# Patient Record
Sex: MALE | Race: WHITE | Employment: FULL TIME | URBAN - METROPOLITAN AREA
[De-identification: names, ages, dates, MRNs, and addresses within clinical notes are randomized per-mention and may not be internally consistent; named-entity substitution may affect disease eponyms.]

---

## 2017-07-31 ENCOUNTER — OFFICE VISIT (OUTPATIENT)
Dept: CARDIOLOGY CLINIC | Age: 42
End: 2017-07-31

## 2017-07-31 VITALS
DIASTOLIC BLOOD PRESSURE: 84 MMHG | HEIGHT: 71 IN | RESPIRATION RATE: 18 BRPM | BODY MASS INDEX: 37.44 KG/M2 | SYSTOLIC BLOOD PRESSURE: 124 MMHG | WEIGHT: 267.4 LBS | HEART RATE: 64 BPM | OXYGEN SATURATION: 98 %

## 2017-07-31 DIAGNOSIS — F17.200 SMOKER: ICD-10-CM

## 2017-07-31 DIAGNOSIS — R06.02 SOB (SHORTNESS OF BREATH): ICD-10-CM

## 2017-07-31 DIAGNOSIS — R07.9 CHEST PAIN, UNSPECIFIED: Primary | ICD-10-CM

## 2017-07-31 DIAGNOSIS — R00.2 PALPITATIONS: ICD-10-CM

## 2017-07-31 RX ORDER — METOPROLOL TARTRATE 25 MG/1
25 TABLET, FILM COATED ORAL 2 TIMES DAILY
COMMUNITY

## 2017-07-31 RX ORDER — GEMFIBROZIL 600 MG/1
600 TABLET, FILM COATED ORAL 2 TIMES DAILY
COMMUNITY

## 2017-07-31 RX ORDER — RANITIDINE 150 MG/1
150 TABLET, FILM COATED ORAL 2 TIMES DAILY
COMMUNITY
End: 2018-10-01

## 2017-07-31 NOTE — MR AVS SNAPSHOT
Visit Information Date & Time Provider Department Dept. Phone Encounter #  
 7/31/2017 10:00 AM Donya Jessica MD [facility] Deanna Buck [phone] [id number] Upcoming Health Maintenance Date Due DTaP/Tdap/Td series (1 - Tdap) 11/27/1998 INFLUENZA AGE 9 TO ADULT 8/1/2017 Allergies as of 7/31/2017  Review Complete On: 7/31/2017 By: Mikel Higgins Severity Noted Reaction Type Reactions Lisinopril  07/31/2017    Cough Current Immunizations  Never Reviewed No immunizations on file. Not reviewed this visit You Were Diagnosed With   
  
 Codes Comments Chest pain, unspecified    -  Primary ICD-10-CM: R07.9 ICD-9-CM: 786.50 SOB (shortness of breath)     ICD-10-CM: R06.02 
ICD-9-CM: 786.05 Vitals BP Pulse Resp Height(growth percentile) Weight(growth percentile) SpO2  
 124/84 (BP 1 Location: Left arm) 64 18 5' 11\" (1.803 m) 267 lb 6.4 oz (121.3 kg) 98% BMI Smoking Status 37.29 kg/m2 Current Every Day Smoker Vitals History BMI and BSA Data Body Mass Index Body Surface Area  
 37.29 kg/m 2 2.47 m 2 Your Updated Medication List  
  
   
This list is accurate as of: 7/31/17 10:43 AM.  Always use your most recent med list.  
  
  
  
  
 LOPID 600 mg tablet Generic drug:  gemfibrozil Take 600 mg by mouth two (2) times a day. metoprolol tartrate 25 mg tablet Commonly known as:  LOPRESSOR Take 12.5 mg by mouth two (2) times a day. ZANTAC 150 mg tablet Generic drug:  raNITIdine Take 150 mg by mouth two (2) times a day. We Performed the Following AMB POC EKG ROUTINE W/ 12 LEADS, INTER & REP [65102 CPT(R)] Introducing [facility]! Susy Tobar introduces [employer] patient portal. Now you can access parts of your medical record, email your doctor's office, and request medication refills online.    
 
1. In your internet browser, go to [email or web address] 2. Click on the First Time User? Click Here link in the Sign In box. You will see the New Member Sign Up page. 3. Enter your [employer] Access Code exactly as it appears below. You will not need to use this code after youve completed the sign-up process. If you do not sign up before the expiration date, you must request a new code. · [employer] Access Code: [email or web address] Expires: 10/29/2017 10:15 AM 
 
4. Enter the last four digits of your Social Security Number (xxxx) and Date of Birth (mm/dd/yyyy) as indicated and click Submit. You will be taken to the next sign-up page. 5. Create a Heart Buddyt ID. This will be your Quickshift login ID and cannot be changed, so think of one that is secure and easy to remember. 6. Create a [employer] password. You can change your password at any time. 7. Enter your Password Reset Question and Answer. This can be used at a later time if you forget your password. 8. Enter your e-mail address. You will receive e-mail notification when new information is available in [address] 9. Click Sign Up. You can now view and download portions of your medical record. 10. Click the Download Summary menu link to download a portable copy of your medical information. If you have questions, please visit the Frequently Asked Questions section of the [employer] website. Remember, [employer] is NOT to be used for urgent needs. For medical emergencies, dial 911. Now available from your iPhone and Android! Please provide this summary of care documentation to your next provider. Your primary care clinician is listed as Yoli Fernandez. If you have any questions after today's visit, please call [phone].

## 2017-07-31 NOTE — PROGRESS NOTES
Extended / Orthostatic Vitals:  Patient Position 2: Supine  BP 2: 122/80  Pulse 2: 64  Patient Position 3: Standing  BP 3: 116/78  Pulse 3: 68

## 2017-07-31 NOTE — LETTER
7/31/2017 1:21 PM 
 
Patient:  Lauralee Mcburney YOB: 1975 Date of Visit: 7/31/2017 Dear Dr. Joe Calderon., MD 
[address] [place] [address] VIA Facsimile: [phone] 
 : Thank you for referring Mr. Lauralee Mcburney to me for evaluation/treatment. Below are the relevant portions of my assessment and plan of care. Assessment: 
 
Left-sided chest pain. Palpitations Hypertension-blood pressure controlled Tobacco dependence/ marijuana use EtOH abuse Plan:  
 
Exercise nuclear study-hold metoprolol night before and the day of the test.  No caffeine for 24 hours prior to test. 
Echocardiogram. 
Holter monitor Advised to decrease/quit his smoking/decrease EtOH intake and quit smoking marijuana . Patient has Chantix but it is not helping him. He is thinking about looking into hypnotization. Follow-up after cardiac workup If you have questions, please do not hesitate to call me. I look forward to following Mr. Hassan along with you. Sincerely, Gurvinder Blancas MD

## 2017-07-31 NOTE — PROGRESS NOTES
Bao Mari MD    [id number] [place], [address] [facility]    Office [phone],MBY [phone]  Pager [phone]    Abdoulaye Lara is a 46 y.o. male referred for evaluation of chest pain. Consult requested by Lashell Resendez MD            Primary care physician:  Lashell Resendez MD      Chief complaint:  Abdoulaye Lara is a 46 y.o. male who complains of   Chief Complaint   Patient presents with    New Patient    Chest Pain (Angina)    Shortness of Breath     Dear Dr Yanet Luna,    I had the pleasure of seeing Mr Abdoulaye Lara in the office today. Assessment:    Left-sided chest pain. Palpitations  Hypertension-blood pressure controlled  Tobacco dependence/ marijuana use EtOH abuse        Plan:     Exercise nuclear study-hold metoprolol night before and the day of the test.  No caffeine for 24 hours prior to test.  Echocardiogram.  Holter monitor  Advised to decrease/quit his smoking/decrease EtOH intake and quit smoking marijuana . Patient has Chantix but it is not helping him. He is thinking about looking into hypnotization. Follow-up after cardiac workup. Labs reviewed7/14/17-/triglycerides 313, HDL-36 LDL-36; creatinine 0.93  Pt understands the plan. Aluestions were answered to the patient's satisfaction. Medication Side Effects and Warnings were discussed with patient: yes  Patient Labs were reviewed and or requested:  yes  Patient Past Records were reviewed and or requested: yes    I appreciate the opportunity to be involved in Mr. Calvin. Please see note below for details. Please do not hesitate to contact us with questions or concerns. Bao Mari MD    Cardiac Testing/ Procedures: A. Cardiac Cath/PCI:    B.ECHO/GEO:    C.StressNuclear/Stress ECHO/Stress test:    D.Vascular:    E. EP:    F. 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 Can occur with rest or with exertion. Sometimes it can be constant and sometimes intermittent. Occasionally diaphoresis present. Denies dyspnea. No swelling lower extremities. Sometimes, he feels his heart is racing. No history of dizziness, syncope, presyncope. Patient states that he is going to [place] for vacation and will do his cardiac testing if needed after his vacation. Past Medical History:   Diagnosis Date    Bell's palsy     Hypertension     Hypertriglyceridemia     Nephrolithiasis       Past Surgical History:   Procedure Laterality Date    HX LITHOTRIPSY      HX VASECTOMY       Family History   Problem Relation Age of Onset    Coronary Artery Disease Mother       Social History   Substance Use Topics    Smoking status: Current Every Day Smoker    Smokeless tobacco: Never Used      Comment: 2-3 packs per day    Alcohol use Yes      Comment: Sixpack beer 4-5 days a week          Medications before admission:    Current Outpatient Prescriptions   Medication Sig Dispense    raNITIdine (ZANTAC) 150 mg tablet Take 150 mg by mouth two (2) times a day.  gemfibrozil (LOPID) 600 mg tablet Take 600 mg by mouth two (2) times a day.  metoprolol tartrate (LOPRESSOR) 25 mg tablet Take 12.5 mg by mouth two (2) times a day. No current facility-administered medications for this visit.             Review of Systems:  (bold if positive, if negative)    Gen:  Eyes:  ENT:  CVS:  As in HPIPulm:  CoughGI:  Abdominal pain,GERD  :    MS:  Skin:  Psych:  Endo:    Hem:  Renal:    Neuro:        Physical Exam:  Visit Vitals    /84 (BP 1 Location: Left arm)    Pulse 64    Resp 18    Ht 5' 11\" (1.803 m)    Wt 267 lb 6.4 oz (121.3 kg)    SpO2 98%    BMI 37.29 kg/m2          Gen: Well-developed, well-nourished, in no acute distress  HEENT:  Pink conjunctivae, hearing intact to voice, moist mucous membranes  Neck: Supple,No JVD, No Carotid Bruit, Thyroid- non tender  Resp: No accessory muscle use, Clear breath sounds, No rales or rhonchi  Card: Regular Rate,Rythm,Normal S1, S2, No murmurs, rubs or gallop. No thrills. Abd:  Soft, non-tender, non-distended, normoactive bowel sounds are present,   MSK: No cyanosis or clubbing  Skin: No rashes or ulcers/tattoos present  Neuro: moving all four extremities, no focal deficit, follows commands appropriately  Psych:  Good insight, oriented to person, place and time, alert, Nml Affect  LE: No edema  Vascular: Radial Pulses 2+ and symmetric        EKG: Sinus bradycardia, normal axis,NSTT      Cxray:    LABS:    No results for input(s): CPK, TROIQ in the last 72 hours.     No lab exists for component: CKQMB, CPKMB    No results found for: WBC, WBCLT, HGB, HGBP, HCT, PHCT, RBCH, PLT, MCV, HGBEXT, HCTEXT, PLTEXT, HGBEXT, HCTEXT, PLTEXT  No results found for: NA, K, CL, CO2, AGAP, GLU, BUN, CREA, BUCR, GFRAA, GFRNA, CA, Mark Nguyen MD

## 2017-09-01 ENCOUNTER — CLINICAL SUPPORT (OUTPATIENT)
Dept: CARDIOLOGY CLINIC | Age: 42
End: 2017-09-01

## 2017-09-01 DIAGNOSIS — R07.9 CHEST PAIN, UNSPECIFIED TYPE: ICD-10-CM

## 2017-09-01 DIAGNOSIS — R00.2 PALPITATIONS: ICD-10-CM

## 2017-09-01 DIAGNOSIS — R06.02 SOB (SHORTNESS OF BREATH): Primary | ICD-10-CM

## 2017-09-01 DIAGNOSIS — R06.02 SOB (SHORTNESS OF BREATH): ICD-10-CM

## 2017-09-01 DIAGNOSIS — R07.89 CHEST DISCOMFORT: Primary | ICD-10-CM

## 2017-09-01 DIAGNOSIS — F17.200 SMOKER: ICD-10-CM

## 2017-09-01 NOTE — PROGRESS NOTES
Explained procedure to patient, Obtaining IV access, radiation exposure, risks and discomforts (for exericse stress test), waiting between injections and obtaining images. All concerns and questions addressed, prior to obtaining consent. See scanned report. Dr. Ruslan Park ordered and Dr. Ruslan Park read study. ID verified per protocol. Pt  reported no symptoms at completion of protocol.

## 2017-09-08 ENCOUNTER — CLINICAL SUPPORT (OUTPATIENT)
Dept: CARDIOLOGY CLINIC | Age: 42
End: 2017-09-08

## 2017-09-08 DIAGNOSIS — R06.02 SOB (SHORTNESS OF BREATH): Primary | ICD-10-CM

## 2017-09-08 NOTE — PROGRESS NOTES
Applied holter monitor for pt per Dr Griffin Fruits  Dx: SOB, palps. Pt has #[id number] & is due back on Mon 9/11/17.

## 2017-09-19 ENCOUNTER — DOCUMENTATION ONLY (OUTPATIENT)
Dept: CARDIOLOGY CLINIC | Age: 42
End: 2017-09-19

## 2017-09-22 ENCOUNTER — OFFICE VISIT (OUTPATIENT)
Dept: CARDIOLOGY CLINIC | Age: 42
End: 2017-09-22

## 2017-09-22 VITALS
HEIGHT: 71 IN | DIASTOLIC BLOOD PRESSURE: 80 MMHG | OXYGEN SATURATION: 99 % | RESPIRATION RATE: 18 BRPM | HEART RATE: 56 BPM | WEIGHT: 268.8 LBS | BODY MASS INDEX: 37.63 KG/M2 | SYSTOLIC BLOOD PRESSURE: 104 MMHG

## 2017-09-22 DIAGNOSIS — R07.89 ATYPICAL CHEST PAIN: Primary | ICD-10-CM

## 2017-09-22 NOTE — LETTER
9/22/2017 9:55 AM 
 
Patient:  Davian Zaragoza YOB: 1975 Date of Visit: 9/22/2017 Dear Dr. Jorge Luis Razo., MD 
[address] [place] [address] VIA Facsimile: [phone] 
 : Thank you for referring Mr. Davian Zaragoza to me for evaluation/treatment. Below are the relevant portions of my assessment and plan of care. Assessment: 
Atypical chest pain probably noncardiac with negative stress test/normal EF on echo Plan:  
Normal exercise nuclear study with good exercise tolerance/normal EF on echo Holter monitor-occasional PAC/PVC. Symptoms correlated with sinus rhythm/sinus arrhythmia. No significant arrhythmias. Patient is getting a GI eval. 
Aggressive cardiovascular risk factor modifications. Advised daily exercise. Follow-up in 1 year or earlier as needed. If you have questions, please do not hesitate to call me. I look forward to following Mr. Hassan along with you. Sincerely, Britt Hernandez MD

## 2017-09-22 NOTE — PROGRESS NOTES
Ulysses Decree, MD    [address] [place], [address] [facility]    Office [phone],LSI [phone]  Pager [phone]    Veronika Lechuga is a 46 y.o. male is here for f/u visit. Primary care physician:  Diandra Valdez MD    Patient Active Problem List   Diagnosis Code    Palpitations R00.2   Clyde Merlin P09.669       Dear Dr. James Canchola,    I had the pleasure of seeing Mr Veronika Lechuga in the office today. Chief complaint:  Chief Complaint   Patient presents with    Follow-up     to testing       Assessment:  Atypical chest pain probably noncardiac with negative stress test/normal EF on echo      Plan:   Normal exercise nuclear study with good exercise tolerance/normal EF on echo  Holter monitor-occasional PAC/PVC. Symptoms correlated with sinus rhythm/sinus arrhythmia. No significant arrhythmias. Patient is getting a GI eval.  Aggressive cardiovascular risk factor modifications. Advised daily exercise. Follow-up in 1 year or earlier as needed. Patient understands the plan. All questions were answered to the patient's satisfaction. Medication Side Effects and Warnings were discussed with patient: yes  Patient Labs were reviewed and or requested:  yes  Patient Past Records were reviewed and or requested: yes    I appreciate the opportunity to be involved in Mr. Calvin. See note below for details. Please do not hesitate to contact us with questions or concerns. Ulysses Decree, MD    Cardiac Testing/ Procedures: A. Cardiac Cath/PCI:    B.ECHO/GEO:9/1/17 EF 60%/Mild wall thickness. C.StressNuclear/Stress ECHO/Stress test: 9/1/17-stress nuclear study-normal; 10.4 minutes; normal EF    D. Vascular:    E. EP: Holter 9/8/17-sinus rhythm, 43-1 20 bpm, 2 PACs, one PVC, diary entries correlated with sinus/sinus arrhythmia. F. Miscellaneous:    Subjective:  Veronika Lechuga is a 46 y.o. male who returns for follow up visit.   Continues to have intermittent transient left/right precordial chest pains with or without activity. Resolved spontaneously. No diaphoresis/dyspnea. No swelling lower extremities. ROS:  (bold if positive, if negative)             Medications before admission:    Current Outpatient Prescriptions   Medication Sig Dispense    raNITIdine (ZANTAC) 150 mg tablet Take 150 mg by mouth two (2) times a day.  gemfibrozil (LOPID) 600 mg tablet Take 600 mg by mouth two (2) times a day.  metoprolol tartrate (LOPRESSOR) 25 mg tablet Take 12.5 mg by mouth two (2) times a day. No current facility-administered medications for this visit. Family History of CAD:    No    Social History:  Current  Smoker  Yes    Physical Exam:  Visit Vitals    /80 (BP 1 Location: Left arm)    Pulse (!) 56    Resp 18    Ht 5' 11\" (1.803 m)    Wt 268 lb 12.8 oz (121.9 kg)    SpO2 99%    BMI 37.49 kg/m2          Gen: Well-developed, well-nourished, in no acute distress  Neck: Supple,No JVD, No Carotid Bruit,   Resp: No accessory muscle use, Clear breath sounds, No rales or rhonchi  Card: Regular Rate,Rythm,Normal S1, S2, No murmurs, rubs or gallop. No thrills.    Abd:  Soft, non-tender, non-distended,BS+,   MSK: No cyanosis  Skin: No rashes    Neuro: moving all four extremities , follows commands appropriately  Psych:  Good insight, oriented to person, place , alert, Nml Affect  LE: No edema        LABS:        No results found for: WBC, HGB, HCT, HCT, PLT, HGBEXT, HCTEXT, HCTEXT, PLTEXT, HGBEXT, HCTEXT, HCTEXT, PLTEXT  No results found for: NA, K, CL, CO2, AGAP, GLU, BUN, CREA, BUCR, GFRAA, GFRNA, CA, GFRAA    No results found for: APTT  No results found for: INR, PTMR, PTP, PT1, PT2  No components found for: Fe Wells MD

## 2017-09-22 NOTE — MR AVS SNAPSHOT
Visit Information Date & Time Provider Department Dept. Phone Encounter #  
 9/22/2017  9:00 AM Hal Dempsey MD [facility] Nerissa Hammond [phone] [id number] Upcoming Health Maintenance Date Due Pneumococcal 19-64 Medium Risk (1 of 1 - PPSV23) 11/27/1996 DTaP/Tdap/Td series (1 - Tdap) 11/27/1998 INFLUENZA AGE 9 TO ADULT 8/1/2017 Allergies as of 9/22/2017  Review Complete On: 9/22/2017 By: Antonio Hurtado Severity Noted Reaction Type Reactions Lisinopril  07/31/2017    Cough Current Immunizations  Never Reviewed No immunizations on file. Not reviewed this visit Vitals BP Pulse Resp Height(growth percentile) Weight(growth percentile) SpO2  
 104/80 (BP 1 Location: Left arm) (!) 56 18 5' 11\" (1.803 m) 268 lb 12.8 oz (121.9 kg) 99% BMI Smoking Status 37.49 kg/m2 Current Every Day Smoker Vitals History BMI and BSA Data Body Mass Index Body Surface Area  
 37.49 kg/m 2 2.47 m 2 Your Updated Medication List  
  
   
This list is accurate as of: 9/22/17  9:31 AM.  Always use your most recent med list.  
  
  
  
  
 LOPID 600 mg tablet Generic drug:  gemfibrozil Take 600 mg by mouth two (2) times a day. metoprolol tartrate 25 mg tablet Commonly known as:  LOPRESSOR Take 12.5 mg by mouth two (2) times a day. ZANTAC 150 mg tablet Generic drug:  raNITIdine Take 150 mg by mouth two (2) times a day. Introducing [address]! Daya Joaquin introduces [facility] patient portal. Now you can access parts of your medical record, email your doctor's office, and request medication refills online. 1. In your internet browser, go to [email or web address] 2. Click on the First Time User? Click Here link in the Sign In box. You will see the New Member Sign Up page. 3. Enter your [facility] [facility] Access Code exactly as it appears below.  You will not need to use this code after youve completed the sign-up process. If you do not sign up before the expiration date, you must request a new code. · [employer] Access Code: [email or web address] Expires: 10/29/2017 10:15 AM 
 
4. Enter the last four digits of your Social Security Number (xxxx) and Date of Birth (mm/dd/yyyy) as indicated and click Submit. You will be taken to the next sign-up page. 5. Create a [employer] ID. This will be your [employer] login ID and cannot be changed, so think of one that is secure and easy to remember. 6. Create a [employer] password. You can change your password at any time. 7. Enter your Password Reset Question and Answer. This can be used at a later time if you forget your password. 8. Enter your e-mail address. You will receive e-mail notification when new information is available in [address] 9. Click Sign Up. You can now view and download portions of your medical record. 10. Click the Download Summary menu link to download a portable copy of your medical information. If you have questions, please visit the Frequently Asked Questions section of the [employer] website. Remember, [employer] is NOT to be used for urgent needs. For medical emergencies, dial 911. Now available from your iPhone and Android! Please provide this summary of care documentation to your next provider. Your primary care clinician is listed as Jagjit Bower. If you have any questions after today's visit, please call [phone].

## 2018-10-01 ENCOUNTER — APPOINTMENT (OUTPATIENT)
Dept: GENERAL RADIOLOGY | Age: 43
End: 2018-10-01
Attending: STUDENT IN AN ORGANIZED HEALTH CARE EDUCATION/TRAINING PROGRAM
Payer: COMMERCIAL

## 2018-10-01 ENCOUNTER — HOSPITAL ENCOUNTER (EMERGENCY)
Age: 43
Discharge: HOME | End: 2018-10-01
Attending: EMERGENCY MEDICINE
Payer: COMMERCIAL

## 2018-10-01 VITALS
BODY MASS INDEX: 37.98 KG/M2 | OXYGEN SATURATION: 96 % | DIASTOLIC BLOOD PRESSURE: 85 MMHG | SYSTOLIC BLOOD PRESSURE: 115 MMHG | RESPIRATION RATE: 14 BRPM | TEMPERATURE: 98.2 F | HEIGHT: 72 IN | HEART RATE: 81 BPM | WEIGHT: 280.43 LBS

## 2018-10-01 DIAGNOSIS — R07.9 CHEST PAIN, UNSPECIFIED TYPE: Primary | ICD-10-CM

## 2018-10-01 LAB
ALBUMIN SERPL-MCNC: 4.4 G/DL (ref 3.5–5)
ALBUMIN/GLOB SERPL: 1.2 {RATIO} (ref 1.1–2.2)
ALP SERPL-CCNC: 77 U/L (ref 45–117)
ALT SERPL-CCNC: 50 U/L (ref 12–78)
ANION GAP SERPL CALC-SCNC: 7 MMOL/L (ref 5–15)
AST SERPL-CCNC: 24 U/L (ref 15–37)
ATRIAL RATE: 79 BPM
BASOPHILS # BLD: 0.1 K/UL (ref 0–0.1)
BASOPHILS NFR BLD: 1 % (ref 0–1)
BILIRUB SERPL-MCNC: 1.1 MG/DL (ref 0.2–1)
BUN SERPL-MCNC: 15 MG/DL (ref 6–20)
BUN/CREAT SERPL: 19 (ref 12–20)
CALCIUM SERPL-MCNC: 8.6 MG/DL (ref 8.5–10.1)
CALCULATED P AXIS, ECG09: 27 DEGREES
CALCULATED R AXIS, ECG10: 6 DEGREES
CALCULATED T AXIS, ECG11: -13 DEGREES
CHLORIDE SERPL-SCNC: 106 MMOL/L (ref 97–108)
CK MB CFR SERPL CALC: 0.8 % (ref 0–2.5)
CK MB SERPL-MCNC: 1.9 NG/ML (ref 5–25)
CK SERPL-CCNC: 207 U/L (ref 39–308)
CK SERPL-CCNC: 244 U/L (ref 39–308)
CK SERPL-CCNC: 244 U/L (ref 39–308)
CO2 SERPL-SCNC: 27 MMOL/L (ref 21–32)
CREAT SERPL-MCNC: 0.79 MG/DL (ref 0.7–1.3)
DIAGNOSIS, 93000: NORMAL
DIFFERENTIAL METHOD BLD: NORMAL
EOSINOPHIL # BLD: 0.1 K/UL (ref 0–0.4)
EOSINOPHIL NFR BLD: 1 % (ref 0–7)
ERYTHROCYTE [DISTWIDTH] IN BLOOD BY AUTOMATED COUNT: 11.5 % (ref 11.5–14.5)
GLOBULIN SER CALC-MCNC: 3.6 G/DL (ref 2–4)
GLUCOSE SERPL-MCNC: 106 MG/DL (ref 65–100)
HCT VFR BLD AUTO: 42.9 % (ref 36.6–50.3)
HGB BLD-MCNC: 15.4 G/DL (ref 12.1–17)
IMM GRANULOCYTES # BLD: 0 K/UL (ref 0–0.04)
IMM GRANULOCYTES NFR BLD AUTO: 0 % (ref 0–0.5)
INR PPP: 1.1 (ref 0.9–1.1)
LYMPHOCYTES # BLD: 1.7 K/UL (ref 0.8–3.5)
LYMPHOCYTES NFR BLD: 19 % (ref 12–49)
MAGNESIUM SERPL-MCNC: 2 MG/DL (ref 1.6–2.4)
MCH RBC QN AUTO: 32.6 PG (ref 26–34)
MCHC RBC AUTO-ENTMCNC: 35.9 G/DL (ref 30–36.5)
MCV RBC AUTO: 90.9 FL (ref 80–99)
MONOCYTES # BLD: 0.6 K/UL (ref 0–1)
MONOCYTES NFR BLD: 7 % (ref 5–13)
NEUTS SEG # BLD: 6.6 K/UL (ref 1.8–8)
NEUTS SEG NFR BLD: 72 % (ref 32–75)
NRBC # BLD: 0 K/UL (ref 0–0.01)
NRBC BLD-RTO: 0 PER 100 WBC
P-R INTERVAL, ECG05: 148 MS
PLATELET # BLD AUTO: 312 K/UL (ref 150–400)
PMV BLD AUTO: 10.3 FL (ref 8.9–12.9)
POTASSIUM SERPL-SCNC: 3.5 MMOL/L (ref 3.5–5.1)
PROT SERPL-MCNC: 8 G/DL (ref 6.4–8.2)
PROTHROMBIN TIME: 11.1 SEC (ref 9–11.1)
Q-T INTERVAL, ECG07: 356 MS
QRS DURATION, ECG06: 92 MS
QTC CALCULATION (BEZET), ECG08: 408 MS
RBC # BLD AUTO: 4.72 M/UL (ref 4.1–5.7)
SODIUM SERPL-SCNC: 140 MMOL/L (ref 136–145)
TROPONIN I SERPL-MCNC: <0.05 NG/ML
TROPONIN I SERPL-MCNC: <0.05 NG/ML
VENTRICULAR RATE, ECG03: 79 BPM
WBC # BLD AUTO: 9.1 K/UL (ref 4.1–11.1)

## 2018-10-01 PROCEDURE — 82550 ASSAY OF CK (CPK): CPT | Performed by: EMERGENCY MEDICINE

## 2018-10-01 PROCEDURE — 83735 ASSAY OF MAGNESIUM: CPT | Performed by: EMERGENCY MEDICINE

## 2018-10-01 PROCEDURE — 36415 COLL VENOUS BLD VENIPUNCTURE: CPT | Performed by: EMERGENCY MEDICINE

## 2018-10-01 PROCEDURE — 71045 X-RAY EXAM CHEST 1 VIEW: CPT

## 2018-10-01 PROCEDURE — 85025 COMPLETE CBC W/AUTO DIFF WBC: CPT | Performed by: EMERGENCY MEDICINE

## 2018-10-01 PROCEDURE — 99285 EMERGENCY DEPT VISIT HI MDM: CPT

## 2018-10-01 PROCEDURE — 82553 CREATINE MB FRACTION: CPT | Performed by: EMERGENCY MEDICINE

## 2018-10-01 PROCEDURE — 84484 ASSAY OF TROPONIN QUANT: CPT | Performed by: EMERGENCY MEDICINE

## 2018-10-01 PROCEDURE — 93005 ELECTROCARDIOGRAM TRACING: CPT

## 2018-10-01 PROCEDURE — 74011250637 HC RX REV CODE- 250/637: Performed by: EMERGENCY MEDICINE

## 2018-10-01 PROCEDURE — 74011250637 HC RX REV CODE- 250/637: Performed by: STUDENT IN AN ORGANIZED HEALTH CARE EDUCATION/TRAINING PROGRAM

## 2018-10-01 PROCEDURE — 80053 COMPREHEN METABOLIC PANEL: CPT | Performed by: EMERGENCY MEDICINE

## 2018-10-01 PROCEDURE — 85610 PROTHROMBIN TIME: CPT | Performed by: EMERGENCY MEDICINE

## 2018-10-01 RX ORDER — ACETAMINOPHEN 500 MG
1000 TABLET ORAL ONCE
Status: COMPLETED | OUTPATIENT
Start: 2018-10-01 | End: 2018-10-01

## 2018-10-01 RX ORDER — NITROGLYCERIN 0.4 MG/1
0.4 TABLET SUBLINGUAL
Status: COMPLETED | OUTPATIENT
Start: 2018-10-01 | End: 2018-10-01

## 2018-10-01 RX ORDER — ASPIRIN 325 MG
325 TABLET ORAL ONCE
Status: COMPLETED | OUTPATIENT
Start: 2018-10-01 | End: 2018-10-01

## 2018-10-01 RX ORDER — LANSOPRAZOLE 30 MG/1
30 TABLET, ORALLY DISINTEGRATING, DELAYED RELEASE ORAL DAILY
COMMUNITY
End: 2019-12-06

## 2018-10-01 RX ADMIN — ASPIRIN 325 MG: 325 TABLET ORAL at 08:24

## 2018-10-01 RX ADMIN — ACETAMINOPHEN 1000 MG: 500 TABLET, FILM COATED ORAL at 09:35

## 2018-10-01 RX ADMIN — NITROGLYCERIN 0.4 MG: 0.4 TABLET, ORALLY DISINTEGRATING SUBLINGUAL at 08:26

## 2018-10-01 RX ADMIN — NITROGLYCERIN 0.4 MG: 0.4 TABLET, ORALLY DISINTEGRATING SUBLINGUAL at 08:32

## 2018-10-01 RX ADMIN — NITROGLYCERIN 1 INCH: 20 OINTMENT TOPICAL at 08:49

## 2018-10-01 RX ADMIN — NITROGLYCERIN 0.4 MG: 0.4 TABLET, ORALLY DISINTEGRATING SUBLINGUAL at 08:37

## 2018-10-01 NOTE — ROUTINE PROCESS
Dr Ward Nicely reviewed discharge instructions with the patient. The patient verbalized understanding. Alert and stable to walk at discharge.

## 2018-10-01 NOTE — ED PROVIDER NOTES
EMERGENCY DEPARTMENT HISTORY AND PHYSICAL EXAM 
 
 
Date: 10/1/2018 Patient Name: Daniela Singh History of Presenting Illness Chief Complaint Patient presents with  Chest Pain  
  states \"feels like someone standing on my chest\" with shortness of breath History Provided By: Patient HPI: Daniela Singh, 38 y.o. male with PMHx significant for HTN, Hypertriglyceridemia, DM, Smoking, Alcohol Use, presents POV to the ED with cc of Chest pain that is in the left center of his chest. Feels like someone is sitting on his chest with intermittent stabbing pains. Does not get worse with palpation. Reports that it started around 6 pm last night when he was sitting at home. Exertion makes his pain worse and he gets increasingly short of breath with minimal exertion. Reports neck, jaw and back pain that are chronic in nature. The patient reports he has a history of intermittent sharp chest pains for which he has had some work up for but has been negative. Last stress in 9/2017 was read as normal. He took 132 mg of aspirin early this morning around 1am when the pain did not go away. He also drank a half can of red bull this morning. PCP: Win Harris MD 
 
Current Outpatient Prescriptions Medication Sig Dispense Refill  lansoprazole (PREVACID SOLUTAB) 30 mg disintegrating tablet Take 30 mg by mouth daily.  gemfibrozil (LOPID) 600 mg tablet Take 600 mg by mouth two (2) times a day.  metoprolol tartrate (LOPRESSOR) 25 mg tablet Take 25 mg by mouth two (2) times a day. Past History Past Medical History: 
Past Medical History:  
Diagnosis Date  Bell's palsy  Hypertension  Hypertriglyceridemia  Nephrolithiasis Past Surgical History: 
Past Surgical History:  
Procedure Laterality Date  HX LITHOTRIPSY  HX VASECTOMY Family History: 
Family History Problem Relation Age of Onset  Coronary Artery Disease Mother Social History: Social History Substance Use Topics  Smoking status: Former Smoker Quit date: 7/1/2018  Smokeless tobacco: Never Used Comment: 2-3 packs per day  Alcohol use Yes Comment: Sixpack beer 4-5 days a week Allergies: Allergies Allergen Reactions  Lisinopril Cough Review of Systems Review of Systems Constitutional: Negative for chills and fever. HENT: Negative for congestion. Eyes: Negative for visual disturbance. Respiratory: Positive for chest tightness and shortness of breath. Cardiovascular: Positive for chest pain. Negative for leg swelling. Gastrointestinal: Negative for abdominal pain and vomiting. Endocrine: Negative for polyuria. Genitourinary: Negative for dysuria. Musculoskeletal: Positive for back pain and neck pain. Skin:  
     Flushing Neurological: Negative for headaches. Psychiatric/Behavioral: Negative for agitation. Physical Exam  
Physical Exam  
Constitutional: He is oriented to person, place, and time. He appears well-developed and well-nourished. Moderate distress HENT:  
Head: Normocephalic and atraumatic. Eyes: Pupils are equal, round, and reactive to light. Right eye exhibits no discharge. Left eye exhibits no discharge. Neck: Normal range of motion. Neck supple. Cardiovascular: Normal rate, regular rhythm, normal heart sounds and intact distal pulses. Exam reveals no gallop and no friction rub. No murmur heard. Pulmonary/Chest:  
CTAB, no wheeze rales or rhonchi, no accessory muscle use Abdominal: Soft. There is no tenderness. There is no rebound and no guarding. Musculoskeletal: Normal range of motion. Neurological: He is alert and oriented to person, place, and time. No focal deficits Skin: Skin is warm and dry. Facial flushing Psychiatric: He has a normal mood and affect. Diagnostic Study Results Labs - Recent Results (from the past 12 hour(s)) EKG, 12 LEAD, INITIAL Collection Time: 10/01/18  7:36 AM  
Result Value Ref Range Ventricular Rate 79 BPM  
 Atrial Rate 79 BPM  
 P-R Interval 148 ms QRS Duration 92 ms Q-T Interval 356 ms  
 QTC Calculation (Bezet) 408 ms Calculated P Axis 27 degrees Calculated R Axis 6 degrees Calculated T Axis -13 degrees Diagnosis Normal sinus rhythm Nonspecific T wave abnormality No previous ECGs available Confirmed by Clary Ashraf ([id number]) on 10/1/2018 8:33:13 AM 
  
CBC WITH AUTOMATED DIFF Collection Time: 10/01/18  8:14 AM  
Result Value Ref Range WBC 9.1 4.1 - 11.1 K/uL  
 RBC 4.72 4.10 - 5.70 M/uL  
 HGB 15.4 12.1 - 17.0 g/dL HCT 42.9 36.6 - 50.3 % MCV 90.9 80.0 - 99.0 FL  
 MCH 32.6 26.0 - 34.0 PG  
 MCHC 35.9 30.0 - 36.5 g/dL  
 RDW 11.5 11.5 - 14.5 % PLATELET 564 518 - 401 K/uL MPV 10.3 8.9 - 12.9 FL  
 NRBC 0.0 0  WBC ABSOLUTE NRBC 0.00 0.00 - 0.01 K/uL NEUTROPHILS 72 32 - 75 % LYMPHOCYTES 19 12 - 49 % MONOCYTES 7 5 - 13 % EOSINOPHILS 1 0 - 7 % BASOPHILS 1 0 - 1 % IMMATURE GRANULOCYTES 0 0.0 - 0.5 % ABS. NEUTROPHILS 6.6 1.8 - 8.0 K/UL  
 ABS. LYMPHOCYTES 1.7 0.8 - 3.5 K/UL  
 ABS. MONOCYTES 0.6 0.0 - 1.0 K/UL  
 ABS. EOSINOPHILS 0.1 0.0 - 0.4 K/UL  
 ABS. BASOPHILS 0.1 0.0 - 0.1 K/UL  
 ABS. IMM. GRANS. 0.0 0.00 - 0.04 K/UL  
 DF AUTOMATED METABOLIC PANEL, COMPREHENSIVE Collection Time: 10/01/18  8:14 AM  
Result Value Ref Range Sodium 140 136 - 145 mmol/L Potassium 3.5 3.5 - 5.1 mmol/L Chloride 106 97 - 108 mmol/L  
 CO2 27 21 - 32 mmol/L Anion gap 7 5 - 15 mmol/L Glucose 106 (H) 65 - 100 mg/dL BUN 15 6 - 20 MG/DL Creatinine 0.79 0.70 - 1.30 MG/DL  
 BUN/Creatinine ratio 19 12 - 20 GFR est AA >60 >60 ml/min/1.73m2 GFR est non-AA >60 >60 ml/min/1.73m2 Calcium 8.6 8.5 - 10.1 MG/DL Bilirubin, total 1.1 (H) 0.2 - 1.0 MG/DL  
 ALT (SGPT) 50 12 - 78 U/L  
 AST (SGOT) 24 15 - 37 U/L Alk.  phosphatase 77 45 - 117 U/L  
 Protein, total 8.0 6.4 - 8.2 g/dL Albumin 4.4 3.5 - 5.0 g/dL Globulin 3.6 2.0 - 4.0 g/dL A-G Ratio 1.2 1.1 - 2.2    
TROPONIN I Collection Time: 10/01/18  8:14 AM  
Result Value Ref Range Troponin-I, Qt. <0.05 <0.05 ng/mL CK W/ CKMB & INDEX Collection Time: 10/01/18  8:14 AM  
Result Value Ref Range  39 - 308 U/L  
 CK - MB 1.9 <3.6 NG/ML  
 CK-MB Index 0.8 0 - 2.5 CK W/ REFLX CKMB Collection Time: 10/01/18  8:14 AM  
Result Value Ref Range  39 - 308 U/L  
PROTHROMBIN TIME + INR Collection Time: 10/01/18  8:14 AM  
Result Value Ref Range INR 1.1 0.9 - 1.1 Prothrombin time 11.1 9.0 - 11.1 sec MAGNESIUM Collection Time: 10/01/18  8:14 AM  
Result Value Ref Range Magnesium 2.0 1.6 - 2.4 mg/dL TROPONIN I Collection Time: 10/01/18 11:54 AM  
Result Value Ref Range Troponin-I, Qt. <0.05 <0.05 ng/mL CK W/ REFLX CKMB Collection Time: 10/01/18 11:54 AM  
Result Value Ref Range  39 - 308 U/L Radiologic Studies -  
XR CHEST PORT Final Result CT Results  (Last 48 hours) None CXR Results  (Last 48 hours) 10/01/18 0746  XR CHEST PORT Final result Impression:  IMPRESSION:  
No acute finding Narrative:  INDICATION: Chest pain COMPARISON: None A single frontal view was obtained. The time of this study is 0742 hours. The  
lungs are clear. The heart and mediastinal shadows are normal.   
   
   
   
  
  
 
 
 
Medical Decision Making I am the first provider for this patient. I reviewed the vital signs, available nursing notes, past medical history, past surgical history, family history and social history. Vital Signs-Reviewed the patient's vital signs. Patient Vitals for the past 12 hrs: 
 Temp Pulse Resp BP SpO2  
10/01/18 1100 - 81 14 115/85 96 % 10/01/18 1030 - 84 19 124/79 96 % 10/01/18 1000 - 83 21 123/76 96 % 10/01/18 0930 - 84 17 134/65 97 % 10/01/18 0915 - 91 19 142/90 96 % 10/01/18 0900 - 89 19 147/76 97 % 10/01/18 0849 - 90 - 132/75 -  
10/01/18 0845 - 87 17 132/75 97 % 10/01/18 0841 - 92 14 142/63 97 % 10/01/18 0837 - 79 - 142/63 -  
10/01/18 0832 - 88 - (!) 157/97 -  
10/01/18 0830 - 87 16 (!) 157/97 98 % 10/01/18 0827 - 85 14 - 97 % 10/01/18 0826 - 85 - (!) 154/94 -  
10/01/18 0815 - 85 15 (!) 154/94 97 % 10/01/18 0801 - 82 19 (!) 164/95 97 % 10/01/18 0745 - 87 12 (!) 158/131 98 % 10/01/18 0731 98.2 °F (36.8 °C) 82 20 (!) 187/110 100 % Pulse Oximetry Analysis - 100% on room air Cardiac Monitor:  
Rate: 82 bpm 
Rhythm: Normal Sinus Rhythm EKG interpretation: (Preliminary) Rhythm: normal sinus rhythm; and regular . Rate (approx.): 79; Axis: normal; NE interval: normal; QRS interval: normal ; ST/T wave: depressions and non-specific changes; Other findings: Compared to previous 7/31/17, Inferior ST depressions are chronic. Lateral T wave flattening and TWI is new in comparison to previous Records Reviewed: Nursing Notes, Old Medical Records and Previous electrocardiograms Provider Notes (Medical Decision Making):  
38year old male patient with multiple cardiac risk factors presents to the ED for chest pain. EKG with nonspecific ST changes. HEART Score 6. Will get two sets of cardiac enzymes three hours apart. Cxray. Will treat chest pain with Nitro Tabs and Paste and reassess. Low risk for PE confirmed with PERC score. Pain more consistent with ischemia than dissection. No trauma to suggest PTX (also has bilateral breath sounds), or tamponade, no vomiting to consider Borhaeves. ED Course:  
Initial assessment performed. The patients presenting problems have been discussed, and they are in agreement with the care plan formulated and outlined with them. I have encouraged them to ask questions as they arise throughout their visit. 08:55 Jeff Lambert MD at bedside for repeat evaluation.  SBP improved, but patient complaining of headache. Tylenol to be given. Await second troponin in 4 hours with observation. 9:08 AM 
Chest pain from an 8 to a 2 after three sublingual nitro tablets 1:15 PM 
Pt chest pressure has resolved at this time. Two troponins over four hours apart are negative. Spoke directly to his cardiology office and scheduled an appointment for tomorrow at 1020. Pt is aware of this appointment and is able to go. Disposition: 
Discharge with Cardiology follow up tomorrow PLAN: 
1. Discharge Medication List as of 10/1/2018  1:01 PM  
  
 
2. Follow-up Information Follow up With Details Comments Contact Info Malick Martinez MD In 1 week  [facility] [phone] [facility] 
[phone] Roberto Carlos Olivas MD Call today for follow up recheck [address] [facility] [address] [place] [address] 
[phone] Return to ED if worse Diagnosis Clinical Impression: 1. Chest pain, unspecified type Attestations: 
I personally saw and examined the patient. I have reviewed and agree with the residents findings, including all diagnostic interpretations, and plans as written. I was present during the key portions of separately billed procedures. Kierra Santana.  MD Rose Marie

## 2018-10-01 NOTE — ED NOTES
CP now 0/10, pt reports he feels warm across his chest.  Medicated with tylenol as ordered. Plan to draw repeat troponin at 12 nn explained. Resting with call bell and cell phone within reach.

## 2018-10-01 NOTE — LETTER
[address] 
[facility] EMERGENCY DEPT 
[address] [place] [address] [address] [id number] 
[phone] Work/School Note Date: 10/1/2018 To Whom It May concern: 
 
Deysi Samayoa was seen and treated today in the emergency room by the following provider(s): 
Attending Provider: Frank Grullon.  MD Rose Marie 
Resident: Vernell Salcido Steen should not return to work until he is seen and cleared by his cardiologist 
 
Sincerely, 
 
 
 
 
Linus Gonzalez DO

## 2018-10-01 NOTE — DISCHARGE INSTRUCTIONS
Your work up today was negative for an acute cause of your chest pain. This does not mean that you arent at risk for Heart attack in the future. You should follow up with your Cardiologist as soon as possible to have a stress test scheduled in order to further investigate your risk of having major cardiac event in the next 30 days. If you have new or worsening symptoms please return immediately to the ED for further work up. Chest Pain: Care Instructions  Your Care Instructions  There are many things that can cause chest pain. Some are not serious and will get better on their own in a few days. But some kinds of chest pain need more testing and treatment. Your doctor may have recommended a follow-up visit in the next 8 to 12 hours. If you are not getting better, you may need more tests or treatment. Even though your doctor has released you, you still need to watch for any problems. The doctor carefully checked you, but sometimes problems can develop later. If you have new symptoms or if your symptoms do not get better, get medical care right away. If you have worse or different chest pain or pressure that lasts more than 5 minutes or you passed out (lost consciousness), call 911 or seek other emergency help right away. A medical visit is only one step in your treatment. Even if you feel better, you still need to do what your doctor recommends, such as going to all suggested follow-up appointments and taking medicines exactly as directed. This will help you recover and help prevent future problems. How can you care for yourself at home? · Rest until you feel better. · Take your medicine exactly as prescribed. Call your doctor if you think you are having a problem with your medicine. · Do not drive after taking a prescription pain medicine. When should you call for help?   Call 911 if:    · You passed out (lost consciousness).     · You have severe difficulty breathing.     · You have symptoms of a heart attack. These may include:  ¨ Chest pain or pressure, or a strange feeling in your chest.  ¨ Sweating. ¨ Shortness of breath. ¨ Nausea or vomiting. ¨ Pain, pressure, or a strange feeling in your back, neck, jaw, or upper belly or in one or both shoulders or arms. ¨ Lightheadedness or sudden weakness. ¨ A fast or irregular heartbeat. After you call 911, the  may tell you to chew 1 adult-strength or 2 to 4 low-dose aspirin. Wait for an ambulance. Do not try to drive yourself.    Call your doctor today if:    · You have any trouble breathing.     · Your chest pain gets worse.     · You are dizzy or lightheaded, or you feel like you may faint.     · You are not getting better as expected.     · You are having new or different chest pain. Where can you learn more? Go to http://jerald-desiree.info/. Enter A120 in the search box to learn more about \"Chest Pain: Care Instructions. \"  Current as of: November 20, 2017  Content Version: 11.7  © 0750-7349 [employer]. Care instructions adapted under license by [employer] (which disclaims liability or warranty for this information). If you have questions about a medical condition or this instruction, always ask your healthcare professional. [address] any warranty or liability for your use of this information. Resume your diet and contact your cardiologist before beginning an exercise regimen.

## 2018-10-03 ENCOUNTER — OFFICE VISIT (OUTPATIENT)
Dept: CARDIOLOGY CLINIC | Age: 43
End: 2018-10-03

## 2018-10-03 VITALS
SYSTOLIC BLOOD PRESSURE: 130 MMHG | RESPIRATION RATE: 16 BRPM | WEIGHT: 279.2 LBS | BODY MASS INDEX: 37.82 KG/M2 | HEIGHT: 72 IN | HEART RATE: 67 BPM | OXYGEN SATURATION: 99 % | DIASTOLIC BLOOD PRESSURE: 82 MMHG

## 2018-10-03 DIAGNOSIS — E66.9 OBESITY (BMI 35.0-39.9 WITHOUT COMORBIDITY): ICD-10-CM

## 2018-10-03 DIAGNOSIS — Z87.891 HISTORY OF TOBACCO ABUSE: ICD-10-CM

## 2018-10-03 DIAGNOSIS — R00.2 PALPITATIONS: ICD-10-CM

## 2018-10-03 DIAGNOSIS — R06.02 SOB (SHORTNESS OF BREATH): ICD-10-CM

## 2018-10-03 DIAGNOSIS — R07.89 ATYPICAL CHEST PAIN: Primary | ICD-10-CM

## 2018-10-03 DIAGNOSIS — G47.33 OBSTRUCTIVE SLEEP APNEA: ICD-10-CM

## 2018-10-03 PROBLEM — E66.01 SEVERE OBESITY (HCC): Status: ACTIVE | Noted: 2018-10-03

## 2018-10-03 NOTE — PROGRESS NOTES
Cora Pal, [facility]  [address]  [place], [address] [facility]    Office [phone]  Fax [phone]        Johnetta Simmonds is a 38 y.o. male who is followed outpatient by Dr. Chiki Magaña and was last seen September 2017. Patient is here today for follow-up of chest pain and palpitations. Assessment  Encounter Diagnoses   Name Primary?  Atypical chest pain Yes    SOB (shortness of breath)     Palpitations     History of tobacco abuse     Obstructive sleep apnea     Obesity (BMI 35.0-39.9 without comorbidity)        Recommendations:    Atypical Chest Pain, SOB  - ongoing 2-3 yrs, recently more freq  - past cardiac work-up negative  - will repeat NM stress and TTE given risk factors; however, pain does not sound cardiac  - rec pulmonary work up if not already done; pt to see PCP, PCP note reports some pulmonary work-up in past   - low risk for PE, O2 sats and HR stable    Palpitations, frequent dizziness  - PACs, PVCs noted prior   - assess PAC / PVC burden w/ 48 hr holter; look for other arrythmias    HTN  - hm BPs 130s/80s  - cont metoprolol 25mg q12 hrs    Recent Hx of Tobacco Use  - quit 3 months ago; prev smoked 2 ppd x 20 years    JENNA  - prev intolerant to mask and not using CPAP  - would like new provider, will refer to sleep center for add'll eval / tx    Elevated Fasting Blood Glucose  - Glucose 115 on recent labs, management per PCP    Obesity, BMI 37.87  - weight up 11lb since last year    Rec diet and weight loss; will discuss in more detail at follow-up visit as pt feels very limited by CP today. OK to return to work. Denies excessive lifting, pushing, or pulling at work. Is up walking and supervising; does not perform manual labor. Follow-up Disposition:  Return in about 3 months (around 1/3/2019), or if symptoms worsen or fail to improve.      Subjective:    Johnetta Simmonds is a 46-year-old male with past medical history of hypertension, hypertriglyceridemia, and tobacco use who was recently seen in the emergency room with chest pain. Patient states chest pain has been ongoing for 2-3 years; symptoms are unchanged but have recently become more frequent. Typically pain only lasts a few minutes but this past Monday pain started in the middle of the night and was ongoing in the AM causing pt to go to the ED. Patient feels as if someone is sitting on his chest with intermittent stabbing to the left side; most recently pain has been associated with SOB, diaphoresis, and left am numbness. Denies dizziness, lightheadedness, nausea, and vomiting with pain. Pain appears to be erratic and can come at any time regardless of activity level. In ED pt had negative trop x2 and ECG w/ non specific T wave changes. Aspirin and Nitro provided some relief. Separative of chest pain patient also reports frequent palpitations. Feels a fluttering in his chest so significant it can take his breath away. This has also become more frequent and occurs daily. Lastly patient has persistent dizziness and lightheadedness. States this is not related to positional changes. Blood pressures at home well controlled. Denies syncope and falls. Denies any exertional chest pain, orthopnea, edema and paroxysmal nocturnal dyspnea. Denies cough and congestion. Denies abdominal pain, diarrhea, and constipation. Denies abnormal bleeding. Was previously diagnosed with JENNA but does not use CPAP. No longer has machine. Reports frequent apneic episodes during the day where he must gasp for air. Snores at night and feels unrested / fatigued during the day. Stopped smoking 3 months ago. Denies pulmonary workup including pulmonary function studies or chest CT. Family history negative for CAD    Cardiac testing  Echo 9/1/17: LVEF 60%, no WMAs, RV nml, no sig valvular disease    NM Stress 9/1/2017: 13.4 METS terminated 2/2 fatigue, occ PVCs - good exercise tolerance.   Nml myocardial perfusion study; no inducile ischemia, LVEF 53%    24hr Holter 9/8/2017: predominant rhythm sinus; rates , 2 PACs, 1 PVC. Diary entries of pain correlate with sinus / sinus arrhythmia  rates 67-85 BPM.     ECG 10/1/2018: NSR, non specific T wave abnormality  ECG 7/31/17: Sinus Bradycardia, rate 59    Past Medical History:   Diagnosis Date    Bell's palsy     Hypertension     Hypertriglyceridemia     Nephrolithiasis         Current Outpatient Prescriptions   Medication Sig Dispense Refill    lansoprazole (PREVACID SOLUTAB) 30 mg disintegrating tablet Take 30 mg by mouth daily.  gemfibrozil (LOPID) 600 mg tablet Take 600 mg by mouth two (2) times a day.  metoprolol tartrate (LOPRESSOR) 25 mg tablet Take 25 mg by mouth two (2) times a day. Allergies   Allergen Reactions    Lisinopril Cough          Review of Systems  Constitutional: Negative for fever, chills. Positive for fatigue, frequent diaphoresis  Respiratory: Negative for cough, hemoptysis, sputum production, and wheezing. Positive shortness of breath  Cardiovascular: Negative for orthopnea, claudication, leg swelling and PND. Positive chest pain and palpitations  Gastrointestinal: Negative for heartburn, nausea, vomiting, blood in stool and melena. Genitourinary: Negative for dysuria and flank pain. Neurological: Negative for focal weakness, loss of consciousness, weakness and headaches. Endo/Heme/Allergies: Negative for abnormal bleeding  Psychiatric/Behavioral: Negative for memory loss. The patient does not have insomnia.       Physical Exam    Visit Vitals    /82 (BP 1 Location: Right arm, BP Patient Position: Sitting)    Pulse 67    Resp 16    Ht 6' (1.829 m)    Wt 279 lb 3.2 oz (126.6 kg)    SpO2 99%    BMI 37.87 kg/m2     Wt Readings from Last 3 Encounters:   10/03/18 279 lb 3.2 oz (126.6 kg)   10/01/18 280 lb 6.8 oz (127.2 kg)   09/22/17 268 lb 12.8 oz (121.9 kg)      General - well developed well nourished, obese  Neck - JVP normal, thyroid nl  Cardiac - normal S1, S2, no murmurs, rubs or gallops. No clicks, left chest tender to palpation  Vascular - carotids without bruits, radials and pedal pulses equal bilateral  Lungs - clear to auscultation bilaterals, no rales, wheezing or rhonchi  Abd -protuberant, soft nontender, nondistended, positive bowel sounds   extremities - no edema, warm, well-perfused  Skin - no rash  Neuro - nonfocal  Psych - normal mood and affect      Labs:   Fasting 8/28/18: Glucose 115, Cr. 0.70.  BUN 12, , K 4.2, Bili 0.5, AST 25, ALT 30, eGFR 118  Triglycerides 216, HDL 34, Tot Chol 126, LDL 49,     Jamee Sundar, ANP

## 2018-10-03 NOTE — LETTER
NOTIFICATION OF RETURN TO WORK / SCHOOL 
 
10/3/2018 Mr. Daniela Singh [address] [place] [id number] To Whom It May Concern: 
 
Daniela Singh is safe to return to work Monday 10/8/18. If there are questions or concerns please have the patient contact our office.  
 
 
 
Sincerely, 
 
 
Cinthia Barnard NP

## 2018-10-03 NOTE — PROGRESS NOTES
1. Have you been to the ER, urgent care clinic since your last visit? Hospitalized since your last visit? [facility] 2. Have you seen or consulted any other health care providers outside of the [address] [place] [address] since your last visit? Include any pap smears or colon screening. No    Chief Complaint   Patient presents with    Shortness of Breath     39 y/o male reports to office for chest pressure.  Chest Pain     Pt reports that shocking feeling in chest w/ SOB.  Dizziness    Numbness     Pt reports numbness and tingling left arm. Pt states was given aspirin and Nitroglycerin temporary relief.      Visit Vitals    /82 (BP 1 Location: Right arm, BP Patient Position: Sitting)    Pulse 67    Resp 16    Ht 6' (1.829 m)    Wt 279 lb 3.2 oz (126.6 kg)    SpO2 99%    BMI 37.87 kg/m2

## 2018-10-03 NOTE — MR AVS SNAPSHOT
[age] y [address] [place] [address] 
[phone] Patient: Kylee Ball MRN: [id number] :1975 Visit Information Date & Time Provider Department Dept. Phone Encounter #  
 10/3/2018 11:00 AM Titi Frank NP [facility] Jacqueline Crain [phone] [id number] Your Appointments 2018  2:00 PM  
ESTABLISHED PATIENT with Richard Lund MD  
[facility] Appt Note: annual; annual; annual pt/s from   
 [address] [facility] [place] [address]  
[address] [place] [address] [address] Upcoming Health Maintenance Date Due DTaP/Tdap/Td series (1 - Tdap) 1998 Influenza Age 5 to Adult 2018 Allergies as of 10/3/2018  Review Complete On: 10/3/2018 By: Joel Holden Severity Noted Reaction Type Reactions Lisinopril  2017    Cough Current Immunizations  Never Reviewed No immunizations on file. Not reviewed this visit You Were Diagnosed With   
  
 Codes Comments Atypical chest pain    -  Primary ICD-10-CM: R07.89 ICD-9-CM: 786.59   
 SOB (shortness of breath)     ICD-10-CM: R06.02 
ICD-9-CM: 786.05 Palpitations     ICD-10-CM: R00.2 ICD-9-CM: 785.1 Smoker     ICD-10-CM: U05.200 ICD-9-CM: 305.1 Vitals BP Pulse Resp Height(growth percentile) Weight(growth percentile) SpO2  
 130/82 (BP 1 Location: Right arm, BP Patient Position: Sitting) 67 16 6' (1.829 m) 279 lb 3.2 oz (126.6 kg) 99% BMI Smoking Status 37.87 kg/m2 Former Smoker Vitals History BMI and BSA Data Body Mass Index Body Surface Area  
 37.87 kg/m 2 2.54 m 2 Preferred Pharmacy Pharmacy Name Phone [address] [place], [address] [facility]. [phone] Your Updated Medication List  
  
   
 This list is accurate as of 10/3/18 12:13 PM.  Always use your most recent med list.  
  
  
  
  
 lansoprazole 30 mg disintegrating tablet Commonly known as:  Melanie Rana Take 30 mg by mouth daily. LOPID 600 mg tablet Generic drug:  gemfibrozil Take 600 mg by mouth two (2) times a day. metoprolol tartrate 25 mg tablet Commonly known as:  LOPRESSOR Take 25 mg by mouth two (2) times a day. We Performed the Following 2D ECHO COMPLETE ADULT (TTE) W OR WO CONTR [78360 CPT(R)] To-Do List   
 10/04/2018 ECG:  STRESS TEST CARDIOLITE Patient Instructions I will schedule you for a repeat stress test, as well as ultrasound of the heart and a 48 hour monitor. Please follow-up with primary care as soon as possible to have your lungs evaluated. See the MD in 3 months. Introducing [facility]! Dear Tex Castro: Thank you for requesting a [employer] account. Our records indicate that you already have an active [employer] information [employer] account. You can access your account anytime at [email or web address] Did you know that you can access your hospital and ER discharge instructions at any time in [employer] information [employer]? You can also review all of your test results from your hospital stay or ER visit. Additional Information If you have questions, please visit the Frequently Asked Questions section of the uma information technology website at [email or web address] Remember, [employer] information [employer] is NOT to be used for urgent needs. For medical emergencies, dial 911. Now available from your iPhone and Android! Please provide this summary of care documentation to your next provider. Your primary care clinician is listed as NONE. If you have any questions after today's visit, please call [phone].

## 2018-10-03 NOTE — PATIENT INSTRUCTIONS
I will schedule you for a repeat stress test, as well as ultrasound of the heart and a 48 hour monitor. Please follow-up with primary care as soon as possible to have your lungs evaluated. See the MD in 3 months. If chest pain returns and not resolving, then seek medical help / attention.

## 2018-10-05 ENCOUNTER — TELEPHONE (OUTPATIENT)
Dept: SLEEP MEDICINE | Age: 43
End: 2018-10-05

## 2018-10-19 ENCOUNTER — CLINICAL SUPPORT (OUTPATIENT)
Dept: CARDIOLOGY CLINIC | Age: 43
End: 2018-10-19

## 2018-10-19 ENCOUNTER — DOCUMENTATION ONLY (OUTPATIENT)
Dept: CARDIOLOGY CLINIC | Age: 43
End: 2018-10-19

## 2018-10-19 DIAGNOSIS — R00.2 PALPITATIONS: ICD-10-CM

## 2018-10-19 DIAGNOSIS — R06.02 SOB (SHORTNESS OF BREATH): ICD-10-CM

## 2018-10-19 DIAGNOSIS — R07.89 ATYPICAL CHEST PAIN: ICD-10-CM

## 2018-10-19 NOTE — PROGRESS NOTES
Pt started 48hr holter monitor for palpitations per Dr. Gato Hammond. Pt given instructions and verbalized understanding.

## 2018-11-02 ENCOUNTER — CLINICAL SUPPORT (OUTPATIENT)
Dept: CARDIOLOGY CLINIC | Age: 43
End: 2018-11-02

## 2018-11-02 DIAGNOSIS — R07.89 ATYPICAL CHEST PAIN: Primary | ICD-10-CM

## 2018-11-02 DIAGNOSIS — R06.02 SOB (SHORTNESS OF BREATH): Primary | ICD-10-CM

## 2018-11-02 DIAGNOSIS — R06.02 SHORTNESS OF BREATH: ICD-10-CM

## 2018-11-02 DIAGNOSIS — R00.2 PALPITATIONS: ICD-10-CM

## 2018-11-02 DIAGNOSIS — R07.9 CHEST PAIN, UNSPECIFIED TYPE: ICD-10-CM

## 2018-11-02 DIAGNOSIS — R06.02 SOB (SHORTNESS OF BREATH): ICD-10-CM

## 2018-11-02 NOTE — PROGRESS NOTES
See scanned document. Ordering Doctor:Dr. Deborah Canchola  Reading Doctor:Dr. Deborah Canchola    Patient tolerated procedure well, but unable to reach THR due to knee pain after 9 1/2 min on the TM. Switched to [employer].

## 2018-11-05 NOTE — ADDENDUM NOTE
Addended by: Shashank Necessary on: 11/5/2018 11:06 AM     Modules accepted: Orders, Level of Service, SmartSet

## 2018-11-07 ENCOUNTER — TELEPHONE (OUTPATIENT)
Dept: CARDIOLOGY CLINIC | Age: 43
End: 2018-11-07

## 2018-11-07 NOTE — TELEPHONE ENCOUNTER
Called to review recent stress / echo results. No option to leave VM. Stress and echo were both nml. Stress Cardiolite 11/2/2018: nml lexiscan. LVEF 47%  Echo 11/2/18: LVEF 55%, no WMA, mild concentric hypertrophy.

## 2018-11-23 ENCOUNTER — OFFICE VISIT (OUTPATIENT)
Dept: NEUROLOGY | Age: 43
End: 2018-11-23

## 2018-11-23 VITALS
HEIGHT: 72 IN | DIASTOLIC BLOOD PRESSURE: 88 MMHG | HEART RATE: 53 BPM | SYSTOLIC BLOOD PRESSURE: 130 MMHG | WEIGHT: 279 LBS | BODY MASS INDEX: 37.79 KG/M2 | OXYGEN SATURATION: 98 %

## 2018-11-23 DIAGNOSIS — G89.29 CHRONIC INTRACTABLE HEADACHE, UNSPECIFIED HEADACHE TYPE: ICD-10-CM

## 2018-11-23 DIAGNOSIS — H53.2 DIPLOPIA: ICD-10-CM

## 2018-11-23 DIAGNOSIS — R20.0 BILATERAL NUMBNESS AND TINGLING OF ARMS AND LEGS: ICD-10-CM

## 2018-11-23 DIAGNOSIS — R51.9 CHRONIC INTRACTABLE HEADACHE, UNSPECIFIED HEADACHE TYPE: ICD-10-CM

## 2018-11-23 DIAGNOSIS — R42 DIZZINESS: ICD-10-CM

## 2018-11-23 DIAGNOSIS — R20.2 BILATERAL NUMBNESS AND TINGLING OF ARMS AND LEGS: ICD-10-CM

## 2018-11-23 DIAGNOSIS — H02.401 PTOSIS, RIGHT EYELID: ICD-10-CM

## 2018-11-23 DIAGNOSIS — R29.810 FACIAL DROOP: Primary | ICD-10-CM

## 2018-11-23 RX ORDER — OMEPRAZOLE 20 MG/1
20 TABLET, DELAYED RELEASE ORAL 2 TIMES DAILY
COMMUNITY

## 2018-11-23 RX ORDER — AMITRIPTYLINE HYDROCHLORIDE 25 MG/1
TABLET, FILM COATED ORAL
Qty: 60 TAB | Refills: 1 | Status: SHIPPED | OUTPATIENT
Start: 2018-11-23 | End: 2019-02-20 | Stop reason: SDUPTHER

## 2018-11-23 NOTE — PROGRESS NOTES
Name:  Tala Bonilla      :  1975    PCP:   Sidney Doe NP      Referring:  As above  MRN:   [id number]    Chief Complaint:   Chief Complaint   Patient presents with    Other     double vision possible MS       HISTORY OF PRESENT ILLNESS:     This is a 38 y.o. male who presents for evaluation of double vision and to evaluate for possible Multiple Sclerosis. Pt describes that over the past few months, he's had sporadic episodes where vision goes blurry and sees double, \"like you're looking at something cross-eyed,\" which resolves quickly after rubbing eyes. No complaints of dysphagia, or head/ neck weakness. He describes having right eyelid droop and left lower facial weakness (crossed signs ?) since  when he was diagnosed with Bell's Palsy at his local hospital (? [address] [place] [address]) then  transferred to [facility] ER where he says the Bell's Palsy diagnosis was confirmed in the ER and he was discharged home. He doesn't recall ever having a Brain MRI to evaluate for these symptoms, but his wife says it's very hard to get him to go to the doctor. He denies any leg weakness, but says arms feel weak. Other complaints: constant/ frequent dizziness (was using OTC meclizine), frequent headaches, numbness/ tingling in all extremities (upper >> lower), constant sensation of tingling that makes him want to move legs, memory difficulty. Chronic back pain and neck pain. Says that the other complaints have been present for years, but worsened over the past year. Had [facility] Spotted Fever (+ antibodies but didn't have the typical symptoms). Admits to having anxiety (mild), mild depression.         Complete Review of Systems: + chest pain, depression, fatigue, headaches, joint pain, memory loss, muscle pain, muscle weakness, dyspnea, snoring, stomach pain, vertigo, visual disturbance; otherwise as noted in HPI     Allergies   Allergen Reactions    Lisinopril Cough     Past Medical History:   Diagnosis Date    Bell's palsy     Headache     Hypertension     Hypertriglyceridemia     Nephrolithiasis      Current Outpatient Medications   Medication Sig Dispense Refill    omeprazole (PRILOSEC OTC) 20 mg tablet Take 20 mg by mouth daily.  amitriptyline (ELAVIL) 25 mg tablet Take 1 to 2 tabs at bedtime. Antidepressant to reduce headache frequency and help sleep 60 Tab 1    gemfibrozil (LOPID) 600 mg tablet Take 600 mg by mouth two (2) times a day.  metoprolol tartrate (LOPRESSOR) 25 mg tablet Take 25 mg by mouth two (2) times a day.  lansoprazole (PREVACID SOLUTAB) 30 mg disintegrating tablet Take 30 mg by mouth daily.        Past Surgical History:   Procedure Laterality Date    HX LITHOTRIPSY      HX VASECTOMY       Family History   Problem Relation Age of Onset    Coronary Artery Disease Mother      Social History     Socioeconomic History    Marital status:      Spouse name: Not on file    Number of children: Not on file    Years of education: Not on file    Highest education level: Not on file   Social Needs    Financial resource strain: Not on file    Food insecurity - worry: Not on file    Food insecurity - inability: Not on file   [employer] needs - medical: Not on file   [employer] needs - non-medical: Not on file   Occupational History    Not on file   Tobacco Use    Smoking status: Former Smoker     Last attempt to quit: 2018     Years since quittin.3    Smokeless tobacco: Never Used    Tobacco comment: 2-3 packs per day   Substance and Sexual Activity    Alcohol use: Yes     Comment: Sixpack beer 4-5 days a week    Drug use: Yes     Types: Marijuana     Comment: Almost on a daily basis    Sexual activity: Not on file   Other Topics Concern    Not on file   Social History Narrative    Not on file       PHYSICAL EXAM  Vitals:    18 1006   BP: 130/88   Pulse: (!) 53   SpO2: 98%   Weight: 126.6 kg (279 lb)   Height: 6' (1.829 m)       General: Alert, cooperative, NAD   Head:  Normocephalic, atraumatic. Eyes:  Conjunctivae/corneas clear   Lungs:  Heart:  Not examined  Not examined   Extremities: No edema. Skin: No rashes    Neurologic Exam       Language: normal  Memory:  Alert, oriented to person, place, situation    Cranial Nerves:  I: smell Not tested   II: visual fields Full to confrontation   II: pupils Equal, round, reactive to light   II: optic disc No papilledema   III,VII: ptosis none   III,IV,VI: extraocular muscles  normal   V: facial light touch sensation  normal   VII: facial muscle function  Left lower facial weakness, right eye ptosis   VIII: hearing symmetric   IX: soft palate elevation  Mild droop on right side   XI: sternocleidomastoid strength 5/5   XII: tongue  midline      Motor: normal bulk, tone, strength in all exts  Sensory: intact to LT, PP x 4 exts   + tinel's at left elbow    Cerebellar: no rest, postural, or intention tremor  Normal FNF bilaterally. Normal Heel-shin on right, pt struggles to do H-Shin with left (flexiblity). Reflexes: 1+ biceps/ triceps; 2+ patellars, 1+ achilles (symmetric) Plantar response: neutral bilaterally    Gait: normal gait including tandem  Romberg negative       ASSESSMENT AND PLAN    ICD-10-CM ICD-9-CM    1. Facial droop R29.810 781.94 MRI BRAIN W WO CONT      ACETYLCHOLINE RECEPTOR PANEL   2. Ptosis, right eyelid H02.401 374.30 MRI BRAIN W WO CONT      ACETYLCHOLINE RECEPTOR PANEL      CK   3. Bilateral numbness and tingling of arms and legs R20.0 782.0 MRI BRAIN W WO CONT    R20.2  MRI CERV SPINE W WO CONT      EMG LIMITED   4. Dizziness R42 780.4 MRI BRAIN W WO CONT      EEG AMB NEURO   5. Chronic intractable headache, unspecified headache type R51 784.0 MRI BRAIN W WO CONT      amitriptyline (ELAVIL) 25 mg tablet   6. Diplopia H53.2 368.2 MRI BRAIN W WO CONT      ACETYLCHOLINE RECEPTOR PANEL       38 y.o. male with multiple complaints    1) Recurrent blurred vision and diplopia.   2) Numbness in both arms. 3) Numbness in both leg requiring him to move legs. 4) Chronic dizziness  5) Chronic headaches (hx of kidney stones precludes use of Topamax). 6) Hx of crossed cranial nerve signs (left lower facial weakness and right eyelid droop) which aren't consistent with Bell's Palsy. Check Ach-r antibodies (eval for Myasthenia Gravis), check MRI Brain +/- contrast (eval for MS, stroke, other abnormalities), check MRI C-spine to evaluate for cord lesions or disc herniations to cause arm numbness, check EEG to r/o underlying seizure discharges, check EMG/ NCS both arms to eval for CTS, Ulnar neuropathy or cervical radiculopathy, check NCS one leg to eval for polyneuropathy. Considered various headache preventives but can't take topamax due to hx of kidney stones, and resting HR is 53 today (bordeline low, on Beta-blocker) so don't want to change betablocker. Will try Amitriptyline 25 mg one to two caps QHS to reduce headache frequency and help insomnia.       Follow up 5-6 weeks to go over results      Signed By: Vincent Bowman MD     November 23, 2018

## 2018-11-23 NOTE — PROGRESS NOTES
New patient for double vision. Referred by PCP    Patient wants to get tested for MS. He has symptoms of memory loss, slurred speech, numbness and tingling, dizziness. Symptoms have been around for awhile but worse in the past 1.5 years. Told he had bells palsy in early 2000s. And 2016 told he had jonny mountain spotted fever. Accompanied by his wife.

## 2018-11-23 NOTE — PATIENT INSTRUCTIONS
[facility]   Statement to Patients  April 1, 2014      In an effort to ensure the large volume of patient prescription refills is processed in the most efficient and expeditious manner, we are asking our patients to assist us by calling your Pharmacy for all prescription refills, this will include also your  Mail Order Pharmacy. The pharmacy will contact our office electronically to continue the refill process. Please do not wait until the last minute to call your pharmacy. We need at least 48 hours (2days) to fill prescriptions. We also encourage you to call your pharmacy before going to  your prescription to make sure it is ready. With regard to controlled substance prescription refill requests (narcotic refills) that need to be picked up at our office, we ask your cooperation by providing us with at least 72 hours (3days) notice that you will need a refill. We will not refill narcotic prescription refill requests after 4:00pm on any weekday, Monday through Thursday, or after 2:00pm on Fridays, or on the weekends. We encourage everyone to explore another way of getting your prescription refill request processed using [employer], our patient web portal through our electronic medical record system. [employer] is an efficient and effective way to communicate your medication request directly to the office and  downloadable as an robinson on your smart phone . [employer] also features a review functionality that allows you to view your medication list as well as leave messages for your physician. Are you ready to get connected? If so please review the attatched instructions or speak to any of our staff to get you set up right away! Thank you so much for your cooperation. Should you have any questions please contact our Practice Administrator.     The Physicians and Staff,  [facility]

## 2018-11-29 LAB
ACHR BIND AB SER-SCNC: <0.03 NMOL/L (ref 0–0.24)
ACHR BLOCK AB SER-ACNC: 15 % (ref 0–25)
ACHR MOD AB/ACHR TOTAL SFR SER: <12 % (ref 0–20)
CK SERPL-CCNC: 71 U/L (ref 24–204)

## 2018-12-24 ENCOUNTER — OFFICE VISIT (OUTPATIENT)
Dept: NEUROLOGY | Age: 43
End: 2018-12-24

## 2018-12-24 DIAGNOSIS — R20.0 BILATERAL NUMBNESS AND TINGLING OF ARMS AND LEGS: Primary | ICD-10-CM

## 2018-12-24 DIAGNOSIS — R20.2 NUMBNESS AND TINGLING: Primary | ICD-10-CM

## 2018-12-24 DIAGNOSIS — R20.0 NUMBNESS AND TINGLING: Primary | ICD-10-CM

## 2018-12-24 DIAGNOSIS — R20.2 BILATERAL NUMBNESS AND TINGLING OF ARMS AND LEGS: Primary | ICD-10-CM

## 2018-12-24 NOTE — PROCEDURES
EMG/ NCS Report  [facility]  - [facility]  [facility]  [facility] [place], [address] [place], [address]   Ph: [phone]   FAX: [phone]/ [phone]  Test Date:  2018    Patient: Neil Rivers : 1975 Physician: Francis Dodson M.D. Sex: Male Height: ' \" Ref Phys: Francis Dodson M.D.   ID#: [id number] Weight:  lbs. Technician: Tsering Alvarez     Patient History:  CC: check both arms for pain in hands, numbness, tingling. Check one leg to eval for peripheral neuropathy. EMG & NCV Findings:  Evaluation of the right Fibular motor nerve showed normal distal onset latency (3.3 ms), normal amplitude (10.3 mV), normal conduction velocity (B Fib-Ankle, 49 m/s), and normal conduction velocity (Poplt-B Fib, 59 m/s). The left median motor and the right median motor nerves showed normal distal onset latency (L3.3, R3.5 ms), normal amplitude (L9.5, R11.9 mV), and normal conduction velocity (Elbow-Wrist, L59, R58 m/s). The right tibial motor nerve showed normal distal onset latency (4.1 ms), normal amplitude (10.4 mV), and normal conduction velocity (Knee-Ankle, 47 m/s). The left ulnar motor nerve showed normal distal onset latency (2.5 ms), normal amplitude (9.4 mV), normal conduction velocity (B Elbow-Wrist, 65 m/s), and normal conduction velocity (A Elbow-B Elbow, 71 m/s). The right ulnar motor nerve showed normal distal onset latency (3.0 ms), reduced amplitude (6.9 mV), normal conduction velocity (B Elbow-Wrist, 76 m/s), and normal conduction velocity (A Elbow-B Elbow, 50 m/s). The left median sensory, the right median sensory, the left radial sensory, the right radial sensory, the right sural sensory, the left ulnar sensory, and the right ulnar sensory nerves showed normal distal peak latency (L3.0, R3.3, L1.9, R1.8, R3.2, L2.8, R2.8 ms) and normal amplitude (L44.2, R43.5, L44.5, R67.4, R15.6, L28.0, R24.9 µV).   The right Sup Fibular sensory nerve showed normal distal peak latency (2.4 ms), normal amplitude (13.1 µV), and normal conduction velocity (Lower leg-Lat ankle, 56 m/s). The left median/ulnar (palm) comparison nerve showed normal distal onset latency (Median Palm, 1.5 ms), normal distal peak latency (Median Palm, 2.0 ms), normal amplitude (Median Palm, 32.5 µV), normal distal onset latency (Ulnar Palm, 1.3 ms), normal distal peak latency (Ulnar Palm, 1.8 ms), and normal amplitude (Ulnar Palm, 9.7 µV). The right median/ulnar (palm) comparison nerve showed normal distal onset latency (Median Palm, 1.8 ms), prolonged distal peak latency (Median Palm, 2.3 ms), normal amplitude (Median Palm, 37.0 µV), normal distal onset latency (Ulnar Palm, 0.9 ms), normal distal peak latency (Ulnar Palm, 1.5 ms), and normal amplitude (Ulnar Palm, 22.1 µV). All left vs. right side differences were within normal limits. All F Wave latencies were within normal limits. All F Wave left vs. right side latency differences were within normal limits. Needle evaluation of the right first dorsal interosseous muscle showed slightly increased polyphasic potentials. All remaining muscles (as indicated in the following table) showed no evidence of electrical instability. 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 No evidence of left CTS. No evidence of cervical radiculopathy or ulnar neuropathy of either upper extremity. No evidence of peripheral neuropathy.        ___________________________  Portia Costa M.D.      Nerve Conduction Studies  Anti Sensory Summary Table     Stim Site NR Peak (ms) Norm Peak (ms) P-T Amp (µV) Norm P-T Amp Site1 Site2 Dist (cm)   Left Median Anti Sensory (2nd Digit)  30.6°C   Wrist    3.0 <4 44.2 >13 Wrist 2nd Digit 14.0   Right Median Anti Sensory (2nd Digit)  32°C   Wrist    3.3 <4 43.5 >13 Wrist 2nd Digit 14.0   Left Radial Anti Sensory (Base 1st Digit)  30.4°C   Wrist    1.9 <2.8 44.5 >11 Wrist Base 1st Digit 10.0   Right Radial Anti Sensory (Base 1st Digit)  31.3°C   Wrist    1.8 <2.8 67.4 >11 Wrist Base 1st Digit 10.0   Right Sup Fibular Anti Sensory (Lat ankle)  31.5°C   Lower leg    2.4 <4.5 13.1 >5 Lower leg Lat ankle 10.0   Site 2    2.6  9.7       Right Sural Anti Sensory (Lat Mall)  32.2°C   Calf    3.2 <4.5 15.6 >4.0 Calf Lat Mall 14.0   Site 2    3.3  18.6       Left Ulnar Anti Sensory (5th Digit)  30.4°C   Wrist    2.8 <4.0 28.0 >9 Wrist 5th Digit 14.0   Right Ulnar Anti Sensory (5th Digit)  31.4°C   Wrist    2.8 <4.0 24.9 >9 Wrist 5th Digit 14.0     Motor Summary Table     Stim Site NR Onset (ms) Norm Onset (ms) O-P Amp (mV) Norm O-P Amp Amp (Prev) (%) Site1 Site2 Dist (cm) Umesh (m/s) Norm Umesh (m/s)   Right Fibular Motor (Ext Dig Brev)  31.2°C   Ankle    3.3 <6.5 10.3 >2.6 100.0 Ankle Ext Dig Brev 8.0     B Fib    10.2  10.3  100.0 B Fib Ankle 34.0 49 >38   Poplt    11.9  10.0  97.1 Poplt B Fib 10.0 59 >42   Left Median Motor (Abd Poll Brev)  30.3°C   Wrist    3.3 <4.5 9.5 >4.1 100.0 Wrist Abd Poll Brev 8.0     Elbow    7.0  8.9  93.7 Elbow Wrist 22.0 59 >49   Right Median Motor (Abd Poll Brev)  30.9°C   Wrist    3.5 <4.5 11.9 >4.1 100.0 Wrist Abd Poll Brev 8.0     Elbow    7.3  11.5  96.6 Elbow Wrist 22.0 58 >49   Right Tibial Motor (Abd Murrieta Brev)  31°C   Ankle    4.1 <6.1 10.4 >5.3 100.0 Ankle Abd Murrieta Brev 8.0     Knee    12.2  7.4  71.2 Knee Ankle 38.0 47 >39   Left Ulnar Motor (Abd Dig Minimi)  30.3°C   Wrist    2.5 <3.1 9.4 >7.0 100.0 Wrist Abd Dig Minimi 8.0  >50   B Elbow    5.9  9.2  97.9 B Elbow Wrist 22.0 65 >50   A Elbow    7.3  9.0  97.8 A Elbow B Elbow 10.0 71 >50   Right Ulnar Motor (Abd Dig Minimi)  30.4°C   Wrist    3.0 <3.1 6.9 >7.0 100.0 Wrist Abd Dig Minimi 8.0  >50   B Elbow    5.9  6.1  88.4 B Elbow Wrist 22.0 76 >50   A Elbow    7.9  6.0  98.4 A Elbow B Elbow 10.0 50 >50     Comparison Summary Table     Stim Site NR Peak (ms) P-T Amp (µV) Site1 Site2 Dist (cm) Delta-0 (ms)   Left Median/Ulnar Palm Comparison (Wrist)  30.2°C   Median Palm    2.0 37.9 Median Palm Ulnar Palm 8.0 0.2   Ulnar Palm    1.8 15.5       Right Median/Ulnar Palm Comparison (Wrist)  29.8°C   Median Palm    2.3 49.3 Median Palm Ulnar Palm 8.0 0.9   Ulnar Palm    1.5 19.0         F Wave Studies     NR F-Lat (ms) Lat Norm (ms) L-R F-Lat (ms) L-R Lat Norm   Right Tibial (Mrkrs) (Abd Hallucis)  30.8°C      47.36 <56  <5.7   Left Ulnar (Mrkrs) (Abd Dig Min)  30.4°C      26.79 <32 0.00 <2.5   Right Ulnar (Mrkrs) (Abd Dig Min)  30.2°C      26.79 <32 0.00 <2.5     H Reflex Studies     NR H-Lat (ms) L-R H-Lat (ms) L-R Lat Norm   Right Tibial (Gastroc)  30.9°C      30.13  <2.0     EMG     Side Muscle Nerve Root Ins Act Fibs Psw Recrt Duration Amp Poly Comment   Left 1stDorInt Ulnar C8-T1 Nml Nml Nml Nml Nml Nml Nml    Left Biceps Musculocut C5-6 Nml Nml Nml Nml Nml Nml Nml    Left Triceps Radial C6-7-8 Nml Nml Nml Nml Nml Nml Nml    Left Deltoid Axillary C5-6 Nml Nml Nml Nml Nml Nml Nml    Left Lower Cerv Parasp Rami C7,T1 Nml Nml Nml Nml Nml Nml Nml    Right Abd Poll Brev Median C8-T1 Nml Nml Nml Nml Nml Nml Nml    Right 1stDorInt Ulnar C8-T1 Nml Nml Nml Nml Nml Nml 1+    Right Biceps Musculocut C5-6 Nml Nml Nml Nml Nml Nml Nml    Right Triceps Radial C6-7-8 Nml Nml Nml Nml Nml Nml Nml    Right Deltoid Axillary C5-6 Nml Nml Nml Nml Nml Nml Nml    Right Lower Cerv Parasp Rami C7,T1 Nml Nml Nml Nml Nml Nml Nml      Waveforms:

## 2018-12-28 ENCOUNTER — HOSPITAL ENCOUNTER (OUTPATIENT)
Dept: MRI IMAGING | Age: 43
Discharge: HOME | End: 2018-12-28
Attending: NURSE PRACTITIONER
Payer: COMMERCIAL

## 2018-12-28 VITALS — BODY MASS INDEX: 37.97 KG/M2 | WEIGHT: 280 LBS

## 2018-12-28 DIAGNOSIS — R20.2 NUMBNESS AND TINGLING: ICD-10-CM

## 2018-12-28 DIAGNOSIS — R20.0 NUMBNESS AND TINGLING: ICD-10-CM

## 2018-12-28 PROCEDURE — 72156 MRI NECK SPINE W/O & W/DYE: CPT

## 2018-12-28 PROCEDURE — A9575 INJ GADOTERATE MEGLUMI 0.1ML: HCPCS | Performed by: RADIOLOGY

## 2018-12-28 PROCEDURE — 74011250636 HC RX REV CODE- 250/636: Performed by: RADIOLOGY

## 2018-12-28 PROCEDURE — 70553 MRI BRAIN STEM W/O & W/DYE: CPT

## 2018-12-28 RX ORDER — GADOTERATE MEGLUMINE 376.9 MG/ML
25 INJECTION INTRAVENOUS
Status: COMPLETED | OUTPATIENT
Start: 2018-12-28 | End: 2018-12-28

## 2018-12-28 RX ADMIN — GADOTERATE MEGLUMINE 25 ML: 376.9 INJECTION INTRAVENOUS at 19:00

## 2019-01-11 ENCOUNTER — OFFICE VISIT (OUTPATIENT)
Dept: SLEEP MEDICINE | Age: 44
End: 2019-01-11

## 2019-01-11 VITALS
SYSTOLIC BLOOD PRESSURE: 134 MMHG | WEIGHT: 276 LBS | BODY MASS INDEX: 37.38 KG/M2 | OXYGEN SATURATION: 96 % | HEIGHT: 72 IN | HEART RATE: 67 BPM | DIASTOLIC BLOOD PRESSURE: 83 MMHG

## 2019-01-11 DIAGNOSIS — G47.33 OSA (OBSTRUCTIVE SLEEP APNEA): Primary | ICD-10-CM

## 2019-01-11 DIAGNOSIS — I10 ESSENTIAL HYPERTENSION: ICD-10-CM

## 2019-01-11 NOTE — PATIENT INSTRUCTIONS
[address] [place]. [place], [address]  Tel.  [phone]  Fax. 100 [facility] 60  [place], [address] [facility]  Tel.  [phone]  Fax. [phone] [address] [place]  Tel.  [phone]  Fax. [phone]     Sleep Apnea: After Your Visit  Your Care Instructions  Sleep apnea occurs when you frequently stop breathing for 10 seconds or longer during sleep. It can be mild to severe, based on the number of times per hour that you stop breathing or have slowed breathing. Blocked or narrowed airways in your nose, mouth, or throat can cause sleep apnea. Your airway can become blocked when your throat muscles and tongue relax during sleep. Sleep apnea is common, occurring in 1 out of 20 individuals. Individuals having any of the following characteristics should be evaluated and treated right away due to high risk and detrimental consequences from untreated sleep apnea:  1. Obesity  2. Congestive Heart failure  3. Atrial Fibrillation  4. Uncontrolled Hypertension  5. Type II Diabetes  6. Night-time Arrhythmias  7. Stroke  8. Pulmonary Hypertension  9. High-risk Driving Populations (pilots, truck drivers, etc.)  10. Patients Considering Weight-loss Surgery    How do you know you have sleep apnea? You probably have sleep apnea if you answer 'yes' to 3 or more of the following questions:  S - Have you been told that you Snore? T - Are you often Tired during the day? O - Has anyone Observed you stop breathing while sleeping? P- Do you have (or are being treated for) high blood Pressure? B - Are you obese (Body Mass Index > 35)? A - Is your Age 48years old or older? N - Is your Neck size greater than 16 inches? G - Are you male Gender? A sleep physician can prescribe a breathing device that prevents tissues in the throat from blocking your airway.  Or your doctor may recommend using a dental device (oral breathing device) to help keep your airway open. In some cases, surgery may be needed to remove enlarged tissues in the throat. Follow-up care is a key part of your treatment and safety. Be sure to make and go to all appointments, and call your doctor if you are having problems. It's also a good idea to know your test results and keep a list of the medicines you take. How can you care for yourself at home? · Lose weight, if needed. It may reduce the number of times you stop breathing or have slowed breathing. · Go to bed at the same time every night. · Sleep on your side. It may stop mild apnea. If you tend to roll onto your back, sew a pocket in the back of your pajama top. Put a tennis ball into the pocket, and stitch the pocket shut. This will help keep you from sleeping on your back. · Avoid alcohol and medicines such as sleeping pills and sedatives before bed. · Do not smoke. Smoking can make sleep apnea worse. If you need help quitting, talk to your doctor about stop-smoking programs and medicines. These can increase your chances of quitting for good. · Prop up the head of your bed 4 to 6 inches by putting bricks under the legs of the bed. · Treat breathing problems, such as a stuffy nose, caused by a cold or allergies. · Use a continuous positive airway pressure (CPAP) breathing machine if lifestyle changes do not help your apnea and your doctor recommends it. The machine keeps your airway from closing when you sleep. · If CPAP does not help you, ask your doctor whether you should try other breathing machines. A bilevel positive airway pressure machine has two types of air pressureâone for breathing in and one for breathing out. Another device raises or lowers air pressure as needed while you breathe. · If your nose feels dry or bleeds when using one of these machines, talk with your doctor about increasing moisture in the air. A humidifier may help.   · If your nose is runny or stuffy from using a breathing machine, talk with your doctor about using decongestants or a corticosteroid nasal spray. When should you call for help? Watch closely for changes in your health, and be sure to contact your doctor if:  · You still have sleep apnea even though you have made lifestyle changes. · You are thinking of trying a device such as CPAP. · You are having problems using a CPAP or similar machine. Where can you learn more? Go to Adzuna. Enter G195 in the search box to learn more about \"Sleep Apnea: After Your Visit. \"   © 7515-8046 Healthwise, Incorporated. Care instructions adapted under license by [facility] [employer] (which disclaims liability or warranty for this information). This care instruction is for use with your licensed healthcare professional. If you have questions about a medical condition or this instruction, always ask your healthcare professional. Roxanne [place] any warranty or liability for your use of this information. PROPER SLEEP HYGIENE    What to avoid  · Do not have drinks with caffeine, such as coffee or black tea, for 8 hours before bed. · Do not smoke or use other types of tobacco near bedtime. Nicotine is a stimulant and can keep you awake. · Avoid drinking alcohol late in the evening, because it can cause you to wake in the middle of the night. · Do not eat a big meal close to bedtime. If you are hungry, eat a light snack. · Do not drink a lot of water close to bedtime, because the need to urinate may wake you up during the night. · Do not read or watch TV in bed. Use the bed only for sleeping and sexual activity. What to try  · Go to bed at the same time every night, and wake up at the same time every morning. Do not take naps during the day. · Keep your bedroom quiet, dark, and cool. · Get regular exercise, but not within 3 to 4 hours of your bedtime. .  · Sleep on a comfortable pillow and mattress.   · If watching the clock makes you anxious, turn it facing away from you so you cannot see the time. · If you worry when you lie down, start a worry book. Well before bedtime, write down your worries, and then set the book and your concerns aside. · Try meditation or other relaxation techniques before you go to bed. · If you cannot fall asleep, get up and go to another room until you feel sleepy. Do something relaxing. Repeat your bedtime routine before you go to bed again. · Make your house quiet and calm about an hour before bedtime. Turn down the lights, turn off the TV, log off the computer, and turn down the volume on music. This can help you relax after a busy day. Drowsy Driving  The [address] [place] [address] Road Traffic Safety Administration cites drowsiness as a causing factor in more than 698,612 police reported crashes annually, resulting in 76,000 injuries and 1,500 deaths. Other surveys suggest 55% of people polled have driven while drowsy in the past year, 23% had fallen asleep but not crashed, 3% crashed, and 2% had and accident due to drowsy driving. Who is at risk? Young Drivers: One study of drowsy driving accidents states that 55% of the drivers were under 25 years. Of those, 75% were male. Shift Workers and Travelers: People who work overnight or travel across time zones frequently are at higher risk of experiencing Circadian Rhythm Disorders. They are trying to work and function when their body is programed to sleep. Sleep Deprived: Lack of sleep has a serious impact on your ability to pay attention or focus on a task. Consistently getting less than the average of 8 hours your body needs creates partial or cumulative sleep deprivation. Untreated Sleep Disorders: Sleep Apnea, Narcolepsy, R.L.S., and other sleep disorders (untreated) prevent a person from getting enough restful sleep. This leads to excessive daytime sleepiness and increases the risk for drowsy driving accidents by up to 7 times.   Medications / Alcohol: Even over the counter medications can cause drowsiness. Medications that impair a drivers attention should have a warning label. Alcohol naturally makes you sleepy and on its own can cause accidents. Combined with excessive drowsiness its effects are amplified. Signs of Drowsy Driving:   * You don't remember driving the last few miles   * You may drift out of your maulik   * You are unable to focus and your thoughts wander   * You may yawn more often than normal   * You have difficulty keeping your eyes open / nodding off   * Missing traffic signs, speeding, or tailgating  Prevention-   Good sleep hygiene, lifestyle and behavioral choices have the most impact on drowsy driving. There is no substitute for sleep and the average person requires 8 hours nightly. If you find yourself driving drowsy, stop and sleep. Consider the sleep hygiene tips provided during your visit as well. Medication Refill Policy: Refills for all medications require 1 week advance notice. Please have your pharmacy fax a refill request. We are unable to fax, or call in \"controled substance\" medications and you will need to pick these prescriptions up from our office. RV ID Activation    Thank you for requesting access to RV ID. Please follow the instructions below to securely access and download your online medical record. RV ID allows you to send messages to your doctor, view your test results, renew your prescriptions, schedule appointments, and more. How Do I Sign Up? 1. In your internet browser, go to [email or web address]. 2. Click on the First Time User? Click Here link in the Sign In box. You will see the New Member Sign Up page. 3. Enter your RV ID Access Code exactly as it appears below. You will not need to use this code after youve completed the sign-up process. If you do not sign up before the expiration date, you must request a new code. RV ID Access Code:  Activation code not generated  Current RV ID Status: Active (This is the date your Mingxieku access code will )    4. Enter the last four digits of your Social Security Number (xxxx) and Date of Birth (mm/dd/yyyy) as indicated and click Submit. You will be taken to the next sign-up page. 5. Create a Ctraxt ID. This will be your Mingxieku login ID and cannot be changed, so think of one that is secure and easy to remember. 6. Create a Mingxieku password. You can change your password at any time. 7. Enter your Password Reset Question and Answer. This can be used at a later time if you forget your password. 8. Enter your e-mail address. You will receive e-mail notification when new information is available in [address] 9. Click Sign Up. You can now view and download portions of your medical record. 10. Click the Download Summary menu link to download a portable copy of your medical information. Additional Information    If you have questions, please call [phone]. Remember, Mingxieku is NOT to be used for urgent needs. For medical emergencies, dial 911.

## 2019-01-11 NOTE — PROGRESS NOTES
[address] [place]. [place], [address]  Tel.  [phone]  Fax. 100 [facility] 60  [place], [address] [facility]  Tel.  [phone]  Fax. [phone] [address] [facility] [place]   Tel.  [phone]  Fax. [phone]         Subjective:      Liv Vasquez is an 41 y.o. male referred for evaluation for a sleep disorder. He complains of snoring associated with daytime tiredness. Symptoms began several years ago, gradually worsening since that time. He usually can fall asleep in 20-30 minutes. Family or house members note snoring. He denies completely or partially paralyzed while falling asleep or waking up. Liv Vasquez does wake up frequently at night. He is not bothered by waking up too early and left unable to get back to sleep. He actually sleeps about 6 hours at night and wakes up about 4 times during the night. He does work shifts:  First Shift. [facility] indicates he does get too little sleep at night. His bedtime is 2200. He awakens at 0500. He does not take naps. He has the following observed behaviors: Loud snoring, Sleep talking, Kicking with legs;  . Other remarks:   He denies of an urge to move extremities due to discomfort or other sensation and denies of dream enactment behavior. [place] Sleepiness Score: 2     Allergies   Allergen Reactions    Lisinopril Cough         Current Outpatient Medications:     gemfibrozil (LOPID) 600 mg tablet, Take 600 mg by mouth two (2) times a day., Disp: , Rfl:     metoprolol tartrate (LOPRESSOR) 25 mg tablet, Take 25 mg by mouth two (2) times a day., Disp: , Rfl:     omeprazole (PRILOSEC OTC) 20 mg tablet, Take 20 mg by mouth daily. , Disp: , Rfl:     amitriptyline (ELAVIL) 25 mg tablet, Take 1 to 2 tabs at bedtime. Antidepressant to reduce headache frequency and help sleep, Disp: 60 Tab, Rfl: 1    lansoprazole (PREVACID SOLUTAB) 30 mg disintegrating tablet, Take 30 mg by mouth daily. , Disp: , Rfl: He  has a past medical history of Bell's palsy, Headache, Hypertension, Hypertriglyceridemia, and Nephrolithiasis. He  has a past surgical history that includes hx vasectomy; hx lithotripsy; and amb poc emg complete (limb) (12/24/2018). He family history includes Coronary Artery Disease in his mother. He  reports that he quit smoking about 6 months ago. he has never used smokeless tobacco. He reports that he drinks alcohol. He reports that he uses drugs. Drug: Marijuana. Review of Systems:  Constitutional:  No significant weight loss or weight gain  Eyes:  No blurred vision  CVS:  No significant chest pain  Pulm:  No significant shortness of breath  GI:  No significant nausea or vomiting  :  No significant nocturia  Musculoskeletal:  No significant joint pain at night  Skin:  No significant rashes  Neuro:  No significant dizziness   Psych:  No active mood issues    Sleep Review of Systems: notable for no difficulty falling asleep; frequent awakenings at night;  regular dreaming noted; no nightmares ; occasional early morning headaches; no memory problems; no concentration issues; no history of any automobile or occupational accidents due to daytime drowsiness. Objective:     Visit Vitals  /83 (BP 1 Location: Left arm, BP Patient Position: Sitting)   Pulse 67   Ht 6' (1.829 m)   Wt 276 lb (125.2 kg)   SpO2 96%   BMI 37.43 kg/m²         General:   Not in acute distress   Eyes:  Anicteric sclerae, no obvious strabismus   Nose:  No obvious nasal septum deviation    Oropharynx:   Class 4 oropharyngeal outlet, thick tongue base, uvula could not be seen due to low-lying soft palate, narrow tonsilo-pharyngeal pilars   Tonsils:   tonsils are not seen due to low-lying soft palate   Neck:   Neck circ. in \"inches\": 18.5; midline trachea   Chest/Lungs:  Equal lung expansion, clear on auscultation    CVS:  Normal rate, regular rhythm; no JVD   Skin:  Warm to touch; no obvious rashes   Neuro:   No focal deficits ; no obvious tremor    Psych:  Normal affect,  normal countenance;          Assessment:       ICD-10-CM ICD-9-CM    1. JENNA (obstructive sleep apnea) G47.33 327.23 POLYSOMNOGRAPHY 1 NIGHT   2. Essential hypertension I10 401.9    3. BMI 37.0-37.9, adult Z68.37 V85.37          Plan:     * The patient currently has a Moderate Risk for having sleep apnea. STOP-BANG score 5.  * Sleep testing was ordered for initial evaluation. * He was provided information on sleep apnea including coresponding risk factors and the importance of proper treatment. * Treatment options if indicated were reviewed today. Patient agrees to a trial of PAP therapy if indicated. * Counseling was provided regarding proper sleep hygiene (including effect of light on sleep), paradoxical intention, stimulus control, sleep environment safety and safe driving. * Effect of sleep disturbance on weight was reviewed. We have recommended a dedicated weight loss through appropriate diet and an exercise regiment as significant weight reduction has been shown to reduce severity of obstructive sleep apnea. * Patient agrees to telephone [phone]  follow-up by myself or lead sleep technologist shortly after sleep study to review results and plan final management.     (patient has given permission for a message to be left regarding test results and further management if patient cannot be cannot be reached directly). Thank you for allowing us to participate in your patient's medical care. We'll keep you updated on these investigations. Dank Boyd MD, FAASM  Electronically signed.  01/11/19

## 2019-02-20 DIAGNOSIS — R51.9 CHRONIC INTRACTABLE HEADACHE, UNSPECIFIED HEADACHE TYPE: ICD-10-CM

## 2019-02-20 DIAGNOSIS — G89.29 CHRONIC INTRACTABLE HEADACHE, UNSPECIFIED HEADACHE TYPE: ICD-10-CM

## 2019-02-21 RX ORDER — AMITRIPTYLINE HYDROCHLORIDE 25 MG/1
TABLET, FILM COATED ORAL
Qty: 44 TAB | Refills: 0 | Status: SHIPPED | OUTPATIENT
Start: 2019-02-21 | End: 2019-09-05

## 2019-02-22 NOTE — TELEPHONE ENCOUNTER
Contacted patient to let him know that we received a refill request from his pharmacy for his amitriptyline and  approved it and has sent it into his pharmacy. He verbalized understanding.

## 2019-03-08 ENCOUNTER — HOSPITAL ENCOUNTER (OUTPATIENT)
Dept: SLEEP MEDICINE | Age: 44
Discharge: HOME | End: 2019-03-08
Payer: COMMERCIAL

## 2019-03-08 DIAGNOSIS — G47.33 OSA (OBSTRUCTIVE SLEEP APNEA): ICD-10-CM

## 2019-03-08 PROCEDURE — 95810 POLYSOM 6/> YRS 4/> PARAM: CPT | Performed by: INTERNAL MEDICINE

## 2019-03-09 VITALS
OXYGEN SATURATION: 97 % | DIASTOLIC BLOOD PRESSURE: 91 MMHG | BODY MASS INDEX: 37.9 KG/M2 | HEART RATE: 91 BPM | WEIGHT: 279.8 LBS | SYSTOLIC BLOOD PRESSURE: 136 MMHG | HEIGHT: 72 IN

## 2019-03-11 ENCOUNTER — TELEPHONE (OUTPATIENT)
Dept: SLEEP MEDICINE | Age: 44
End: 2019-03-11

## 2019-03-11 DIAGNOSIS — G47.33 OSA (OBSTRUCTIVE SLEEP APNEA): Primary | ICD-10-CM

## 2019-03-15 ENCOUNTER — OFFICE VISIT (OUTPATIENT)
Dept: NEUROLOGY | Age: 44
End: 2019-03-15

## 2019-03-15 VITALS
SYSTOLIC BLOOD PRESSURE: 130 MMHG | WEIGHT: 279 LBS | OXYGEN SATURATION: 98 % | DIASTOLIC BLOOD PRESSURE: 82 MMHG | HEIGHT: 72 IN | HEART RATE: 76 BPM | RESPIRATION RATE: 20 BRPM | BODY MASS INDEX: 37.79 KG/M2

## 2019-03-15 DIAGNOSIS — G44.221 CHRONIC TENSION-TYPE HEADACHE, INTRACTABLE: ICD-10-CM

## 2019-03-15 DIAGNOSIS — M79.641 PAIN IN BOTH HANDS: ICD-10-CM

## 2019-03-15 DIAGNOSIS — G56.01 MILD CARPAL TUNNEL SYNDROME, RIGHT: Primary | ICD-10-CM

## 2019-03-15 DIAGNOSIS — M79.642 PAIN IN BOTH HANDS: ICD-10-CM

## 2019-03-15 NOTE — PROGRESS NOTES
Interval HPI:   This is a 41 y.o. male who is following up for     Chief Complaint   Patient presents with    Results     EMG,EEG,MRI, LAB Results       Pt continues to describe aching, burning feeling in hands whenever he's using them. Describes stiff/ aching sensation at bottom of neck and episodes of headache    Reviewed test results      MRI Brain: single non-enhancing T2 white matter spot in left corona radiata of no clinical significance. MRI Brain is otherwise normal.      MRI C-spine: normal MRI c-spine    EEG: normal awake study    EMG: Impression:  Mild right median neuropathy affecting sensory fibers only. No evidence of left CTS. No evidence of cervical radiculopathy or ulnar neuropathy of either upper extremity. No evidence of peripheral neuropathy. Labs: normal CK, normal Acetylcholine receptor antibody panel      Brief ROS: as above or otherwise negative     Allergies   Allergen Reactions    Lisinopril Cough     Current Outpatient Medications   Medication Sig Dispense Refill    amitriptyline (ELAVIL) 25 mg tablet Take 1 to 2 tabs at bedtime. Antidepressant to reduce headache frequency and help sleep 44 Tab 0    omeprazole (PRILOSEC OTC) 20 mg tablet Take 20 mg by mouth daily.  lansoprazole (PREVACID SOLUTAB) 30 mg disintegrating tablet Take 30 mg by mouth daily.  gemfibrozil (LOPID) 600 mg tablet Take 600 mg by mouth two (2) times a day.  metoprolol tartrate (LOPRESSOR) 25 mg tablet Take 25 mg by mouth two (2) times a day.          Past Medical History:   Diagnosis Date    Bell's palsy     Headache     Hypertension     Hypertriglyceridemia     Nephrolithiasis        Past Surgical History:   Procedure Laterality Date    AMB POC EMG COMPLETE (LIMB)  12/24/2018    HX LITHOTRIPSY      HX VASECTOMY         Family History   Problem Relation Age of Onset    Coronary Artery Disease Mother        Social History     Socioeconomic History    Marital status:  Spouse name: Not on file    Number of children: Not on file    Years of education: Not on file    Highest education level: Not on file   Occupational History    Not on file   Social Needs    Financial resource strain: Not on file    Food insecurity:     Worry: Not on file     Inability: Not on file    Transportation needs:     Medical: Not on file     Non-medical: Not on file   Tobacco Use    Smoking status: Former Smoker     Last attempt to quit: 2018     Years since quittin.7    Smokeless tobacco: Never Used    Tobacco comment: 2-3 packs per day   Substance and Sexual Activity    Alcohol use: Yes     Comment: Sixpack beer 4-5 days a week    Drug use: Yes     Types: Marijuana     Comment: Almost on a daily basis    Sexual activity: Not on file   Lifestyle    Physical activity:     Days per week: Not on file     Minutes per session: Not on file    Stress: Not on file   Relationships    Social connections:     Talks on phone: Not on file     Gets together: Not on file     Attends Pentecostalism service: Not on file     Active member of club or organization: Not on file     Attends meetings of clubs or organizations: Not on file     Relationship status: Not on file    Intimate partner violence:     Fear of current or ex partner: Not on file     Emotionally abused: Not on file     Physically abused: Not on file     Forced sexual activity: Not on file   Other Topics Concern    Not on file   Social History Narrative    Not on file         Physical Exam  Blood pressure 130/82, pulse 76, resp. rate 20, height 6' (1.829 m), weight 126.6 kg (279 lb), SpO2 98 %. No acute distress  Neck: no stiffness  Skin: no rashes    Focused Neurological Exam     Mental status: Alert and oriented to person, place situation. Language: normal fluency and comprehension; no dysarthria.       CNs:   Visual fields grossly normal  Extraocular movements intact, no nystagmus  Face appears symmetric and facial strength normal.    Hearing is intact to casual conversation. Sensory: not examined  Motor: Normal bulk and strength in all 4 extremities. Reflexes: not examined  Gait: deferred     Impression      ICD-10-CM ICD-9-CM    1. Mild carpal tunnel syndrome, right G56.01 354.0    2. Chronic tension-type headache, intractable G44.221 339.12         Discussed with patient full neurologic evaluation is essentially normal with exception of mild CTS on right. D/w him that the tightness/ pain in hands when making fist/ closing hands is more likely arthritic in nature. Suggested pt ask PCP for referral to Rheumatology for further evaluation of hands pains. Separately discussed that the episodic headaches are consistent with tension headache. He declined any daily headache preventive medications to help reduce headache frequency.       Follow up if new or worsening symptoms

## 2019-03-15 NOTE — PROGRESS NOTES
Patient is here for a follow up for [facility], and MRI results. He states that he has been feeling about the same. No significant changes.  He still gets weak fast.

## 2019-03-20 ENCOUNTER — DOCUMENTATION ONLY (OUTPATIENT)
Dept: SLEEP MEDICINE | Age: 44
End: 2019-03-20

## 2019-03-20 NOTE — TELEPHONE ENCOUNTER
Jana Arreguin is to be contacted by lead sleep technologist regarding results of Sleep Testing which was indicative of an average AHI of 22 per hour with an SpO2 heriberto of 74% . * A second polysomnogram has been ordered for mask fitting, PAP desensitizing protocol, and pressure titration. * Follow-up appointment will be scheduled 8-12 weeks following PAP initiation to gauge treatment response and compliance. Encounter Diagnosis   Name Primary?     JENNA (obstructive sleep apnea) Yes       Orders Placed This Encounter    SLEEP LAB (PAP TITRATION)     Standing Status:   Future     Standing Expiration Date:   9/20/2019     Order Specific Question:   Reason for Exam     Answer:   JENNA

## 2019-03-20 NOTE — PROGRESS NOTES
This note is being routed to Jonathan Glover NP    Sleep Medicine consult note and sleep study report in patient's chart for review.      Thank you for the referral.

## 2019-04-04 NOTE — PROCEDURES
Name:   Nany Shahid  YOB: 1975  MRN:   [id number]           Procedure:   EEG     Location:   [address]  Date of Recordin18    Date of Interpretation:    3-15-19    Interpreting physician: Zulema Marin MD  Requesting provider:   As above      Indication: 41 y.o. male with complaints of double vision, dizziness, numbness/ tingling in arms and legs. Current medications: has a current medication list which includes the following prescription(s): amitriptyline, omeprazole, lansoprazole, gemfibrozil, and metoprolol tartrate. Technical:   Digital EEG recorded in 10-20 international placement system, multiple montages    Interpretation:   Patient level of alertness: alert  Background pattern: symmetric. Posterior dominant rhythm: 9-10 Hz. Photic stimulation: mild symmetric driving response. Hyperventilation: produced physiological slowing without post-HV abnormalities. Drowsiness recorded: no.  Sleep recorded: no.  Single lead EKG: no abnormalities. Areas of focal slowing: none. Epileptiform discharges: none. Electrographic seizures: none. Impression: This is a normal awake EEG recording. Clinical correlation is necessary.         Zulema Marin MD  [facility]

## 2019-08-15 ENCOUNTER — HOSPITAL ENCOUNTER (OUTPATIENT)
Dept: SLEEP MEDICINE | Age: 44
Discharge: HOME | End: 2019-08-15
Payer: COMMERCIAL

## 2019-08-15 VITALS
BODY MASS INDEX: 39.97 KG/M2 | HEART RATE: 76 BPM | OXYGEN SATURATION: 97 % | WEIGHT: 285.5 LBS | HEIGHT: 71 IN | SYSTOLIC BLOOD PRESSURE: 148 MMHG | TEMPERATURE: 99 F | DIASTOLIC BLOOD PRESSURE: 86 MMHG

## 2019-08-15 DIAGNOSIS — G47.33 OSA (OBSTRUCTIVE SLEEP APNEA): ICD-10-CM

## 2019-08-15 PROCEDURE — 95811 POLYSOM 6/>YRS CPAP 4/> PARM: CPT | Performed by: INTERNAL MEDICINE

## 2019-08-22 ENCOUNTER — TELEPHONE (OUTPATIENT)
Dept: SLEEP MEDICINE | Age: 44
End: 2019-08-22

## 2019-08-22 DIAGNOSIS — G47.33 OSA (OBSTRUCTIVE SLEEP APNEA): Primary | ICD-10-CM

## 2019-08-24 NOTE — TELEPHONE ENCOUNTER
Orders Placed This Encounter    AMB SUPPLY ORDER     Diagnosis: Sleep Apnea ICD-10 Code (G47.33)    Positive Airway Pressure Therapy: Duration of need: 99 months. Respironics DreamStation (Auto Mode):  IPAP: 20 cmH2O; Minimum EPAP: 10 cmH2O. PS min: 4, PS max: 6, Ramp Time: 20 Minutes; Flex: 2. CPAP mask -  As fitted during titration OR patient preference, headgear, heated tubing, and filtesr;  heated humidifier; wireless modem. Remote monitoring enrollment. Real Serna MD, FAA; NPI: [id number]  Electronically signed.  08/24/19

## 2019-08-26 ENCOUNTER — DOCUMENTATION ONLY (OUTPATIENT)
Dept: SLEEP MEDICINE | Age: 44
End: 2019-08-26

## 2019-08-26 NOTE — PROGRESS NOTES
This note is being routed to Dr. Graham Lovell. Sleep Medicine consult note and sleep study report in patient's chart for review.     Thank you for the referral.

## 2019-09-03 ENCOUNTER — TELEPHONE (OUTPATIENT)
Dept: SLEEP MEDICINE | Age: 44
End: 2019-09-03

## 2019-09-03 NOTE — TELEPHONE ENCOUNTER
Reviewed sleep study results with patients wife (listed on PHI). She expressed understanding and is willing to proceed with a trial of BiPAP.

## 2019-09-05 ENCOUNTER — OFFICE VISIT (OUTPATIENT)
Dept: SLEEP MEDICINE | Age: 44
End: 2019-09-05

## 2019-09-05 ENCOUNTER — TELEPHONE (OUTPATIENT)
Dept: SLEEP MEDICINE | Age: 44
End: 2019-09-05

## 2019-09-05 VITALS
HEART RATE: 75 BPM | HEIGHT: 73 IN | BODY MASS INDEX: 38.06 KG/M2 | WEIGHT: 287.2 LBS | TEMPERATURE: 95.8 F | DIASTOLIC BLOOD PRESSURE: 84 MMHG | OXYGEN SATURATION: 97 % | SYSTOLIC BLOOD PRESSURE: 145 MMHG

## 2019-09-05 DIAGNOSIS — G47.33 OSA (OBSTRUCTIVE SLEEP APNEA): Primary | ICD-10-CM

## 2019-09-05 DIAGNOSIS — I10 ESSENTIAL HYPERTENSION: ICD-10-CM

## 2019-09-05 NOTE — PROGRESS NOTES
[address] [place]. [place], [address]   Tel.  [phone]   Fax. [address]   [place], [address]   Tel.  [phone]   Fax. [phone] [address]    Tel.  [phone]   Fax. [phone]       Subjective:   Moira Sagastume is a 41 y.o. male seen for a positive airway pressure follow-up, last seen by Dr. Zully King on 1/11/2019, prior notes reviewed in detail. In lab sleep test  showed AHI of 22.8/hr with a lowest SaO2 of 74%. Subesquent titration showed CPAP failure due to persistent hypopnea during REM sleep, BiPAP titration completed, order placed for RespirCloudvu DreamStation (Auto Mode):  IPAP: 20 cmH2O; Minimum EPAP: 10 cmH2O. PS min: 4, PS max: 6. He is here today for face to face visit requirement from insurance. Spencer Sleepiness Score: 10 and Modified F.O.S.Q. Score Total / 2: 15 which reflects improved sleep quality over therapy time. Allergies   Allergen Reactions    Lisinopril Cough       He has a current medication list which includes the following prescription(s): omeprazole, lansoprazole, gemfibrozil, and metoprolol tartrate. .      He  has a past medical history of Bell's palsy, Headache, Hypertension, Hypertriglyceridemia, and Nephrolithiasis. Sleep Review of Systems: notable for Negative difficulty falling asleep; Positive awakenings at night; Negative early morning headaches; Negative memory problems; Negative concentration issues;  Negative chest pain; Negative shortness of breath; Negative significant joint pain at night; Negative significant muscle pain at night; Negative rashes or itching; Negative heartburn; Negative significant mood issues; 0 afternoon naps per week; Negative history of any automobile or occupational accidents due to daytime drowsiness      Objective:     Visit Vitals  /84   Pulse 75   Temp 95.8 °F (35.4 °C)   Ht 6' 1\" (1.854 m)   Wt 287 lb 3.2 oz (130.3 kg)   SpO2 97% BMI 37.89 kg/m²     Neck circ. in \"inches\": 20      General:   Alert, oriented, not in acute distress   Eyes:  Anicteric Sclerae; no obvious strabismus   Nose:  No obvious nasal septum deviation    Oropharynx:   Mallampati score 4, thick tongue base, uvula not seen due to low-lying soft palate, narrow tonsilo-pharyngeal pilars   Neck:   Midline trachea   Chest/Lungs:  Symmetrical lung expansion, clear lung fields on auscultation    CVS:  Normal rate, regular rhythm,  no JVD   Extremities:  No obvious rashes, no edema    Neuro:  No focal deficits; No obvious tremor    Psych:  Normal affect,  normal countenance       Assessment:       ICD-10-CM ICD-9-CM    1. JENNA (obstructive sleep apnea) G47.33 327.23    2. Essential hypertension I10 401.9    3. BMI 37.0-37.9, adult Z68.37 V85.37        AHI = 22.8(3/2019)       Patient has a history and examination consistent with the diagnosis of sleep apnea. Plan:     Follow-up and Dispositions    · Return in about 3 months (around 12/5/2019). * BiPAP device ordered by Dr. Raghavendra Stewart 8/22/2019    * Counseling was provided regarding the importance of regular PAP use with emphasis on ensuring sufficient total sleep time, proper sleep hygiene, and safe driving. * Re-enforced proper and regular cleaning for the device. * He was asked to contact our office for any problems regarding PAP therapy. 2. Hypertension -  continue on his current regimen, he will continue to monitor his BP and follow up with his PMD for reevaluation/adjustment of medications if warranted. I have reviewed the relationship between hypertension as it relates to sleep-disordered breathing. 3. Recommended a dedicated weight loss program through appropriate diet and exercise regimen as significant weight reduction has been shown to reduce severity of obstructive sleep apnea. Patient's phone number [phone] (home) [phone] (work) were reviewed and confirmed for accuracy.   He gives permission for messages regarding results and appointments to be left at that number. SUDHEER Tello-BC, [address] [place] [address]  Electronically signed.  09/05/19

## 2019-09-05 NOTE — TELEPHONE ENCOUNTER
Faxed order for patients Bilevel setup to [facility] for [employer] to authorize and send to participating provider. Informed patient. Patient is unaware of where he will be in 2-3 months and does not wish to schedule follow up until he gets his machine and knows where he will be. He understands the importance of the follow up appointment.

## 2019-09-05 NOTE — PATIENT INSTRUCTIONS
[address] [place]. [place], [address]  Tel.  [phone]  Fax. [address]  [place], [address]  Tel.  [phone]  Fax. [phone] [address]  Tel.  [phone]  Fax. [phone]     Learning About CPAP for Sleep Apnea  What is CPAP? CPAP is a small machine that you use at home every night while you sleep. It increases air pressure in your throat to keep your airway open. When you have sleep apnea, this can help you sleep better so you feel much better. CPAP stands for \"continuous positive airway pressure. \"  The CPAP machine will have one of the following:  · A mask that covers your nose and mouth  · Prongs that fit into your nose  · A mask that covers your nose only, the most common type. This type is called NCPAP. The N stands for \"nasal.\"  Why is it done? CPAP is usually the best treatment for obstructive sleep apnea. It is the first treatment choice and the most widely used. Your doctor may suggest CPAP if you have:  · Moderate to severe sleep apnea. · Sleep apnea and coronary artery disease (CAD) or heart failure. How does it help? · CPAP can help you have more normal sleep, so you feel less sleepy and more alert during the daytime. · CPAP may help keep heart failure or other heart problems from getting worse. · NCPAP may help lower your blood pressure. · If you use CPAP, your bed partner may also sleep better because you are not snoring or restless. What are the side effects? Some people who use CPAP have:  · A dry or stuffy nose and a sore throat. · Irritated skin on the face. · Sore eyes. · Bloating. If you have any of these problems, work with your doctor to fix them. Here are some things you can try:  · Be sure the mask or nasal prongs fit well. · See if your doctor can adjust the pressure of your CPAP. · If your nose is dry, try a humidifier.   · If your nose is runny or stuffy, try decongestant medicine or a steroid nasal spray. If these things do not help, you might try a different type of machine. Some machines have air pressure that adjusts on its own. Others have air pressures that are different when you breathe in than when you breathe out. This may reduce discomfort caused by too much pressure in your nose. Where can you learn more? Go to [email or web address]  Enter [place] in the search box to learn more about \"Learning About CPAP for Sleep Apnea. \"   © 4495-0468 Healthwise, [employer]. Care instructions adapted under license by [employer] (which disclaims liability or warranty for this information). This care instruction is for use with your licensed healthcare professional. If you have questions about a medical condition or this instruction, always ask your healthcare professional. [address] any warranty or liability for your use of this information. Content Version: 0.5.01405; Last Revised: January 11, 2010  PROPER SLEEP HYGIENE    What to avoid  · Do not have drinks with caffeine, such as coffee or black tea, for 8 hours before bed. · Do not smoke or use other types of tobacco near bedtime. Nicotine is a stimulant and can keep you awake. · Avoid drinking alcohol late in the evening, because it can cause you to wake in the middle of the night. · Do not eat a big meal close to bedtime. If you are hungry, eat a light snack. · Do not drink a lot of water close to bedtime, because the need to urinate may wake you up during the night. · Do not read or watch TV in bed. Use the bed only for sleeping and sexual activity. What to try  · Go to bed at the same time every night, and wake up at the same time every morning. Do not take naps during the day. · Keep your bedroom quiet, dark, and cool. · Get regular exercise, but not within 3 to 4 hours of your bedtime. .  · Sleep on a comfortable pillow and mattress.   · If watching the clock makes you anxious, turn it facing away from you so you cannot see the time. · If you worry when you lie down, start a worry book. Well before bedtime, write down your worries, and then set the book and your concerns aside. · Try meditation or other relaxation techniques before you go to bed. · If you cannot fall asleep, get up and go to another room until you feel sleepy. Do something relaxing. Repeat your bedtime routine before you go to bed again. · Make your house quiet and calm about an hour before bedtime. Turn down the lights, turn off the TV, log off the computer, and turn down the volume on music. This can help you relax after a busy day. Drowsy Driving: The [employer] cites drowsiness as a causing factor in more than 523,237 police reported crashes annually, resulting in 76,000 injuries and 1,500 deaths. Other surveys suggest 55% of people polled have driven while drowsy in the past year, 23% had fallen asleep but not crashed, 3% crashed, and 2% had and accident due to drowsy driving. Who is at risk? Young Drivers: One study of drowsy driving accidents states that 55% of the drivers were under 25 years. Of those, 75% were male. Shift Workers and Travelers: People who work overnight or travel across time zones frequently are at higher risk of experiencing Circadian Rhythm Disorders. They are trying to work and function when their body is programed to sleep. Sleep Deprived: Lack of sleep has a serious impact on your ability to pay attention or focus on a task. Consistently getting less than the average of 8 hours your body needs creates partial or cumulative sleep deprivation. Untreated Sleep Disorders: Sleep Apnea, Narcolepsy, R.L.S., and other sleep disorders (untreated) prevent a person from getting enough restful sleep. This leads to excessive daytime sleepiness and increases the risk for drowsy driving accidents by up to 7 times.   Medications / Alcohol: Even over the counter medications can cause drowsiness. Medications that impair a drivers attention should have a warning label. Alcohol naturally makes you sleepy and on its own can cause accidents. Combined with excessive drowsiness its effects are amplified. Signs of Drowsy Driving:   * You don't remember driving the last few miles   * You may drift out of your maulik   * You are unable to focus and your thoughts wander   * You may yawn more often than normal   * You have difficulty keeping your eyes open / nodding off   * Missing traffic signs, speeding, or tailgating  Prevention-   Good sleep hygiene, lifestyle and behavioral choices have the most impact on drowsy driving. There is no substitute for sleep and the average person requires 8 hours nightly. If you find yourself driving drowsy, stop and sleep. Consider the sleep hygiene tips provided during your visit as well. Medication Refill Policy: Refills for all medications require 1 week advance notice. Please have your pharmacy fax a refill request. We are unable to fax, or call in \"controled substance\" medications and you will need to pick these prescriptions up from our office. [employer] Activation    Thank you for requesting access to [employer]. Please follow the instructions below to securely access and download your online medical record. [employer] allows you to send messages to your doctor, view your test results, renew your prescriptions, schedule appointments, and more. How Do I Sign Up? 1. In your internet browser, go to [email or web address]. 2. Click on the First Time User? Click Here link in the Sign In box. You will see the New Member Sign Up page. 3. Enter your [employer] Access Code exactly as it appears below. You will not need to use this code after youve completed the sign-up process. If you do not sign up before the expiration date, you must request a new code. [employer] Access Code:  Activation code not generated  Current [employer] Status: Active (This is the date your [employer] access code will )    4. Enter the last four digits of your Social Security Number (xxxx) and Date of Birth (mm/dd/yyyy) as indicated and click Submit. You will be taken to the next sign-up page. 5. Create a [email or web address] ID. This will be your [employer] login ID and cannot be changed, so think of one that is secure and easy to remember. 6. Create a [employer] password. You can change your password at any time. 7. Enter your Password Reset Question and Answer. This can be used at a later time if you forget your password. 8. Enter your e-mail address. You will receive e-mail notification when new information is available in [address] 9. Click Sign Up. You can now view and download portions of your medical record. 10. Click the Download Summary menu link to download a portable copy of your medical information. Additional Information    If you have questions, please call [phone]. Remember, [employer] is NOT to be used for urgent needs. For medical emergencies, dial 911.

## 2019-09-06 ENCOUNTER — TELEPHONE (OUTPATIENT)
Dept: SLEEP MEDICINE | Age: 44
End: 2019-09-06

## 2019-09-23 ENCOUNTER — DOCUMENTATION ONLY (OUTPATIENT)
Dept: SLEEP MEDICINE | Age: 44
End: 2019-09-23

## 2019-09-23 NOTE — PROGRESS NOTES
Per patient request PAP order has been sent to [facility] \"STAT\"

## 2019-12-05 ENCOUNTER — TELEPHONE (OUTPATIENT)
Dept: RHEUMATOLOGY | Age: 44
End: 2019-12-05

## 2019-12-06 ENCOUNTER — OFFICE VISIT (OUTPATIENT)
Dept: RHEUMATOLOGY | Age: 44
End: 2019-12-06

## 2019-12-06 VITALS
BODY MASS INDEX: 35.78 KG/M2 | TEMPERATURE: 98.3 F | DIASTOLIC BLOOD PRESSURE: 80 MMHG | WEIGHT: 270 LBS | RESPIRATION RATE: 18 BRPM | HEART RATE: 56 BPM | HEIGHT: 73 IN | SYSTOLIC BLOOD PRESSURE: 136 MMHG

## 2019-12-06 DIAGNOSIS — M06.4 UNDIFFERENTIATED INFLAMMATORY ARTHRITIS (HCC): Primary | ICD-10-CM

## 2019-12-06 DIAGNOSIS — M06.09 SERONEGATIVE RHEUMATOID ARTHRITIS OF MULTIPLE SITES (HCC): ICD-10-CM

## 2019-12-06 RX ORDER — TAMSULOSIN HYDROCHLORIDE 0.4 MG/1
0.4 CAPSULE ORAL AS NEEDED
COMMUNITY

## 2019-12-06 RX ORDER — PREDNISONE 5 MG/1
TABLET ORAL
Qty: 91 TAB | Refills: 0 | Status: SHIPPED | OUTPATIENT
Start: 2019-12-06 | End: 2020-01-20

## 2019-12-06 RX ORDER — AMLODIPINE BESYLATE 5 MG/1
5 TABLET ORAL DAILY
COMMUNITY

## 2019-12-06 NOTE — PROGRESS NOTES
REASON FOR VISIT    This is the initial evaluation for Mr. Hassan a 45 y.o.  male for question of an inflammatory arthritis. The patient is referred to the [facility] at the request of Aroldo Nayak NP.     HISTORY OF PRESENT ILLNESS      I have reviewed and summarized old records from [id number] [place] [address],[address], [facility]    He reports a several year history of diffuse body aches, joint pain and stiffness associated fatigue. His symptoms were worse in the morning. In 12/28/2018, MRI Brain with and without contrast showed There is no intracranial mass, hemorrhage or acute infarction. Optic nerves are grossly symmetric without evidence of abnormal postcontrast enhancement. Solitary focus of increased T2 signal intensity in the left corona radiata without postcontrast enhancement. There is no abnormal parenchymal enhancement. There is no abnormal meningeal enhancement demonstrated. The brain architecture is normal. There is no evidence of midline shift or mass-effect. The ventricles are normal in size, position and configuration. There are no extra-axial fluid collections. Major intracranial vascular flow-voids are unremarkable. The orbits are grossly symmetric. Dural venous sinuses are grossly unremarkable. There is no Chiari or syrinx. Pituitary and infundibulum grossly unremarkable. Cerebellopontine angles are unremarkable. No skull base mass is identified. Cavernous sinuses are symmetric. The mastoid air cells and are well pneumatized and clear. MRI Cervical Spine with and without contrast showed There is normal alignment of the cervical spine. Vertebral body heights are maintained. Marrow signal is normal. The craniocervical junction is intact. The course, caliber, and signal intensity of the spinal cord are normal. There is no pathologic intrathecal enhancement. The paraspinal soft tissues are within normal limits. C2-C3: No herniation or stenosis.  C3-C4: No herniation or stenosis. C4-C5: No herniation or stenosis. C5-C6: No herniation or stenosis. C6-C7: No herniation or stenosis. C7-T1: No herniation or stenosis. In 9/12/2019, labs showed WBC 8.3, lymphocytes 2.5, Hct 41.9%, platelets 999,229, creatinine 1.01 mg/dL, eGFR 92, albumin 4.8 g/dL, ALK 89 U/L, ALT 36 U/L, AST 25 U/L, negative anti-CCP, rheumatoid factor. Today, he complains of sore pain in hands in the morning that is worse and improves with warm water. They feel jammed or hit by a hammer. He has morning stiffness lasting hours. He denies swelling. NSAIDs help a little. He denies ankle, wrist, or foot involvement. In the morning, he has aching pain in his neck and shoulders that worsens as the day goes on. At times, feels a sharp pain in his heels. He complains of thigh and calve discomfort that feels tight or sore and he feels that he needs to stretch them up. This happens during the day or night. He reports having pins and needles when handling a screw  or when driving. He feels that his hand  is weak. He reports a long standing history of dyspnea which he attributed to smoking history. He reports that it may be random at rest but could be associated with work. He is a former smoker for 20 years. His father had Crohn's. Therapy History includes:  Current DMARD therapy includes: none  Prior DMARD therapy includes: none  The following DMARDs have been ineffective: none  The following DMARDs were stopped because of side effects: none    REVIEW OF SYSTEMS    A 15 point review of systems was performed and summarized below. The questionnaire was reviewed with the patient and scanned into the patient's medical record.     General: endorses fatigue, weakness, denies recent weight gain, recent weight loss, fever, drenching night sweats  Musculoskeletal: endorses joint pain, morning stiffness (lasting hours), muscle pain, denies joint swelling  Ears: denies ringing in ears, hearing loss, deafness  Eyes: denies pain, light sensitive, redness, blindness, double vision, blurred vision, excess tearing, dryness, foreign body sensation  Mouth: denies sore tongue, oral ulcers, loss of taste, dryness, increased dental caries  Nose: denies nosebleeds, nasal ulcers  Throat: denies food stuck when swallowing, difficulty with swallowing, hoarseness, pain in jaw while chewing  Neck: endorses swollen glands, denies tender glands  Cardiopulmonary: endorses a long history and random shortness of breath at rest, shortness of breath on exertion, denies pain in chest with deep breaths, pain in chest when lying down, murmurs, sudden changes in heart beat, wheezing, dry cough, productive cough, coughing of blood  Gastrointestinal: endorses heartburn, stomach pain relieved by food, denies nausea, chronic constipation, chronic diarrhea, blood in stools, black stools  Genitourinary: denies pain or burning on urination, blood in urine, cloudy urine, penile ulcers  Hematologic: denies anemia, bleeding tendency, blood clots, bleeding gums  Skin: denies easy bruising, hair loss, rash, rash worsened after sun exposure, hives/urticaria, skin thickening, skin tightness, nodules/bumps, color changes of hands or feet in the cold (Raynaud's)  Neurologic: endorses numbness or tingling in hands, muscle weakness, denies numbness or tingling in feet  Psychiatric: denies depression, excessive worries, PTSD, Bipolar    PAST MEDICAL HISTORY    He has a past medical history of Bell's palsy, Headache, Hypertension, Hypertriglyceridemia, Nephrolithiasis, and [place] spotted fever. FAMILY HISTORY    His family history includes Coronary Artery Disease in his mother; Crohn's Disease in his father. SOCIAL HISTORY    He reports that he quit smoking about 17 months ago. He has never used smokeless tobacco. He reports current alcohol use. He reports current drug use. Drug: Marijuana.     HEALTH MAINTENANCE    Immunizations    There is no immunization history on file for this patient. MEDICATIONS    Current Outpatient Medications   Medication Sig Dispense Refill    amLODIPine (NORVASC) 5 mg tablet Take 5 mg by mouth daily.  tamsulosin (FLOMAX) 0.4 mg capsule Take 0.4 mg by mouth daily.  predniSONE (DELTASONE) 5 mg tablet 4 tabs daily for 7 days, 3 tabs for 7 days, 2 tabs for 14 days, 1 tab for 14 days 91 Tab 0    omeprazole (PRILOSEC OTC) 20 mg tablet Take 20 mg by mouth two (2) times a day.  gemfibrozil (LOPID) 600 mg tablet Take 600 mg by mouth two (2) times a day.  metoprolol tartrate (LOPRESSOR) 25 mg tablet Take 25 mg by mouth two (2) times a day. ALLERGIES    Allergies   Allergen Reactions    Lisinopril Cough       PHYSICAL EXAMINATION    Visit Vitals  /80   Pulse (!) 56   Temp 98.3 °F (36.8 °C)   Resp 18   Ht 6' 1\" (1.854 m)   Wt 270 lb (122.5 kg)   BMI 35.62 kg/m²     Body mass index is 35.62 kg/m². General: Patient is alert, oriented x 3, not in acute distress    HEENT:   Conjunctiva are not injected and appear moist, oral mucous membranes are moist, there are no ulcers present, there is no alopecia, neck is supple, there is no lymphadenopathy. Salivary glands are normal    Cardiovascular:  Heart is regular rate and rhythm, no murmurs. Chest:  Lungs are clear to auscultation bilaterally. Extremities:  Free of clubbing, cyanosis, edema, extremities well perfused. Neurological exam:  Muscle strength is full in upper and lower extremities. Skin exam:  There are no rashes, no tophi, no psoriasis, no active Raynaud's, no livedo reticularis, no periungual erythema. Musculoskeletal exam:  A comprehensive musculoskeletal exam was performed for all joints of each upper and lower extremity and assessed for swelling, tenderness and range of motion.  Pertinent results are documented as below:    Bilateral lateral epicondyle tenderness (right more than left)  NROM of hips  Bilateral IT band tenderness - mid band (Left more than right)    Z-Deformities:   no  [place]ck Deformities:  no  Boutonierre's Deformities:  no  Ulnar Deviation:   no  MCP Subluxation:  no    Joint Count 12/6/2019   MHAQ 0.25   Left elbow - Tender 1   Left elbow - Swollen 1   Left wrist- Tender 1   Left wrist- Swollen 0   Left 2nd PIP - Tender 1   Left 2nd PIP - Swollen 0   Left 4th PIP - Tender 1   Left 4th PIP - Swollen 0   Right elbow - Tender 1   Right elbow - Swollen 1   Right wrist- Tender 1   Right wrist- Swollen 1   Right 2nd MCP - Tender 1   Right 2nd MCP - Swollen 0   Right 4th MCP - Tender 1   Right 4th MCP - Swollen 0   Right 3rd PIP - Tender 1   Right 3rd PIP - Swollen 0   Tender Joint Count (Total) 9   Swollen Joint Count (Total) 3   Physician Assessment (0-10) 3   Patient Assessment (0-10) 4.5   CDAI Total (calculated) 19.5     DATA REVIEW    Prior medical records were reviewed and are summarized as below:    Laboratory data: summarized in the HPI    Imaging: summarized in the HPI. ASSESSMENT AND PLAN    1) Seronegative Inflammatory Arthritis. He has a history of inflammatory joint symptoms. I will start him on a short course of prednisone, called a prednisone taper, with 5 mg tablets. This regimen is to be taken as follows: 4 tabs (20 mg) for 7 days, 3 tabs (15 mg) for 7 days, 2 tabs (10 mg) for 14 days and then 1 tab (5 mg) for 14 days, and then stop. I ordered labs and asked to inform of me treatment response in 2 weeks. If he has improvement, I will start DMARD therapy. If no improvement, I will try a Medrol taper. 2) Bilateral Lateral Epicondylitis. This may be overuse mechanical or enthesitis. I recommended he wear a brace. 3) Dyspnea. This is intermittent. I defer to his PCP. The patient voiced understanding of the aforementioned assessment and plan. Summary of plan was provided in the After Visit Summary patient instructions.  I also provided education about MyChart setup and utility.     TODAY'S ORDERS    Orders Placed This Encounter    QUANTIFERON-TB GOLD PLUS    PROTEIN ELECTROPHORESIS W/ REFLX JONATHAN    CHRONIC HEPATITIS PANEL    HLA-B27    C REACTIVE PROTEIN, QT    SED RATE (ESR)    URIC ACID    predniSONE (DELTASONE) 5 mg tablet     Future Appointments   Date Time Provider Department Center   2/7/2020  1:00 PM MD Shereen Farmer MD, [address] [place] [address]    Adult Rheumatology   Rheumatology Ultrasound Certified  [address] [facility] [address]  [facility], [address] [place] [address]   Phone [phone]  Fax [phone]

## 2019-12-06 NOTE — LETTER
12/6/19 Patient: Joann Pickett YOB: 1975 Date of Visit: 12/6/2019 Zoraida Villa NP 
[address] [address] VIA Facsimile: [phone] Dear Zoraida Villa NP, Thank you for referring Mr. Joann Pickett to [address] [place] [address] for evaluation. My notes for this consultation are attached. If you have questions, please do not hesitate to call me. I look forward to following your patient along with you.  
 
 
Sincerely, 
 
Perri Ramirez MD

## 2019-12-06 NOTE — PATIENT INSTRUCTIONS
I will start you on a short course of prednisone, called a prednisone taper, with 5 mg tablets.      This regimen is to be taken as follows:      - 4 tablets (20 mg), all at once, daily for 7 days   - 3 tablets (15 mg), all at once, daily for 7 days   - 2 tablets (10 mg), all at once, daily for 14 days   - 1 tablet (5 mg), daily for 14 days   - then STOP    Labs today

## 2019-12-13 LAB
ALBUMIN SERPL ELPH-MCNC: 4.2 G/DL (ref 2.9–4.4)
ALBUMIN/GLOB SERPL: 1.4 {RATIO} (ref 0.7–1.7)
ALPHA1 GLOB SERPL ELPH-MCNC: 0.2 G/DL (ref 0–0.4)
ALPHA2 GLOB SERPL ELPH-MCNC: 0.7 G/DL (ref 0.4–1)
B-GLOBULIN SERPL ELPH-MCNC: 1.1 G/DL (ref 0.7–1.3)
COMMENT, 144067: NORMAL
CRP SERPL-MCNC: 6 MG/L (ref 0–10)
ERYTHROCYTE [SEDIMENTATION RATE] IN BLOOD BY WESTERGREN METHOD: 13 MM/HR (ref 0–15)
GAMMA GLOB SERPL ELPH-MCNC: 0.9 G/DL (ref 0.4–1.8)
GAMMA INTERFERON BACKGROUND BLD IA-ACNC: 0.12 IU/ML
GLOBULIN SER CALC-MCNC: 2.9 G/DL (ref 2.2–3.9)
HBV CORE AB SERPL QL IA: NEGATIVE
HBV CORE IGM SERPL QL IA: NEGATIVE
HBV E AB SERPL QL IA: NEGATIVE
HBV E AG SERPL QL IA: NEGATIVE
HBV SURFACE AB SER QL: NON REACTIVE
HBV SURFACE AG SERPL QL IA: NEGATIVE
HCV AB S/CO SERPL IA: <0.1 S/CO RATIO (ref 0–0.9)
HLA-B27 QL NAA+PROBE: NEGATIVE
M PROTEIN SERPL ELPH-MCNC: NORMAL G/DL
M TB IFN-G BLD-IMP: NEGATIVE
M TB IFN-G CD4+ BCKGRND COR BLD-ACNC: 0.18 IU/ML
MITOGEN IGNF BLD-ACNC: >10 IU/ML
PLEASE NOTE, 011150: NORMAL
PROT PATTERN SERPL ELPH-IMP: NORMAL
PROT SERPL-MCNC: 7.1 G/DL (ref 6–8.5)
QUANTIFERON INCUBATION, QF1T: NORMAL
QUANTIFERON TB2 AG: 0.14 IU/ML
SERVICE CMNT-IMP: NORMAL
URATE SERPL-MCNC: 6.4 MG/DL (ref 3.7–8.6)

## 2020-02-07 ENCOUNTER — OFFICE VISIT (OUTPATIENT)
Dept: RHEUMATOLOGY | Age: 45
End: 2020-02-07

## 2020-02-07 VITALS
BODY MASS INDEX: 35.84 KG/M2 | WEIGHT: 270.4 LBS | OXYGEN SATURATION: 94 % | DIASTOLIC BLOOD PRESSURE: 83 MMHG | SYSTOLIC BLOOD PRESSURE: 126 MMHG | HEART RATE: 58 BPM | HEIGHT: 73 IN | RESPIRATION RATE: 20 BRPM | TEMPERATURE: 97.9 F

## 2020-02-07 DIAGNOSIS — M25.50 POLYARTHRALGIA: Primary | ICD-10-CM

## 2020-02-07 RX ORDER — METHYLPREDNISOLONE 4 MG/1
TABLET ORAL
Qty: 91 TAB | Refills: 0 | Status: SHIPPED | OUTPATIENT
Start: 2020-02-07 | End: 2020-03-23

## 2020-02-07 NOTE — PROGRESS NOTES
REASON FOR VISIT    This is a follow-up visit for Mr. Hassan for     ICD-10-CM   1. Polyarthralgia M25.50     Inflammatory arthritis phenotype includes:  Anti-CCP positive: no  Rheumatoid factor positive: no  HLA-B27 positive: no  Erosive disease: N/A  Extra-articular manifestations include: none    Immunosuppression Screening (12/06/2019): Quantiferon TB: negative  PPD:  Not performed  Hepatitis B: negative  Hepatitis C: negative    Therapy History includes:  Current DMARD therapy include: none  Prior DMARD therapy include: none  Discontinued DMARDs because of inefficacy: None  Discontinued DMARDs because of side effects: None    There is no immunization history on file for this patient. Patient Active Problem List   Diagnosis Code    Palpitations R00.2    Smoker F17.200    Severe obesity ([address] E66.01     HISTORY OF PRESENT ILLNESS    Mr. Sabine Tinajero returns for a follow-up. On his last visit, I suspected an inflammatory process so I ordered labs and prescribed a prednisone taper with instruction to inform me of response so I can initiate DMARD therapy if there was relief. Unfortunately, he did not contact me. He felt no improvement but had more energy. Today, he continues to feel the same. He complains of sore pain in hands in the morning that is worse and improves with warm water. They feel jammed or hit by a hammer. He has morning stiffness lasting hours. He denies swelling. He denies fever, weight loss, blurred vision, vision loss, oral ulcers, ankle swelling, dry cough, dyspnea, nausea, vomiting, dysphagia, abdominal pain, black or bloody stool, fall since last visit, rash, easy bruising and increased thirst.    Last toxicity monitoring by blood work was done on 12/06/2019 and did not reveal any significant adverse effects. Most recent inflammatory markers from 12/06/2019 revealed a ESR 13 mm/hr and CRP 6 mg/L.     The patient has not had any interval hospital admissions, infections, or surgeries. REVIEW OF SYSTEMS    A comprehensive review of systems was performed and pertinent results are documented in the HPI, review of systems is otherwise non-contributory. PAST MEDICAL HISTORY    He has a past medical history of Bell's palsy, Headache, Hypertension, Hypertriglyceridemia, Nephrolithiasis, and [facility] spotted fever. FAMILY HISTORY    His family history includes Coronary Artery Disease in his mother; Crohn's Disease in his father. SOCIAL HISTORY    He reports that he quit smoking about 19 months ago. He has never used smokeless tobacco. He reports current alcohol use. He reports current drug use. Drug: Marijuana. MEDICATIONS    Current Outpatient Medications   Medication Sig Dispense Refill    methylPREDNISolone (MEDROL) 4 mg tab 4 tabs (16 mg) for 7 days, 3 tabs (12 mg) for 7 days, 2 tabs (8 mg) for 14 days, 1 tab (4 mg) for 14 days, then stop 91 Tab 0    amLODIPine (NORVASC) 5 mg tablet Take 5 mg by mouth daily.  tamsulosin (FLOMAX) 0.4 mg capsule Take 0.4 mg by mouth as needed.  omeprazole (PRILOSEC OTC) 20 mg tablet Take 20 mg by mouth two (2) times a day.  gemfibrozil (LOPID) 600 mg tablet Take 600 mg by mouth two (2) times a day.  metoprolol tartrate (LOPRESSOR) 25 mg tablet Take 25 mg by mouth two (2) times a day. ALLERGIES    Allergies   Allergen Reactions    Lisinopril Cough       PHYSICAL EXAMINATION    Visit Vitals  /83 (BP 1 Location: Left arm, BP Patient Position: Sitting)   Pulse (!) 58   Temp 97.9 °F (36.6 °C) (Oral)   Resp 20   Ht 6' 1\" (1.854 m)   Wt 270 lb 6.4 oz (122.7 kg)   SpO2 94%   BMI 35.67 kg/m²     Body mass index is 35.67 kg/m². General: Patient is alert, oriented x 3, not in acute distress, girl friend at bedside    HEENT:   Sclerae are not injected and appear moist.  There is no alopecia. Neck is supple     Chest:  Breathing comfortably at room air.     Extremities:  Free of clubbing, cyanosis, edema    Neurological exam:  Muscle strength is full in upper and lower extremities     Skin exam:  There are no rashes, no alopecia, no discoid lesions, no active Raynaud's, no livedo reticularis, no periungual erythema. Musculoskeletal exam:  A comprehensive musculoskeletal exam was NOT performed for all joints of each upper and lower extremity and assessed for swelling, tenderness and range of motion. Positive results are documented as below:    Previous Exam    Bilateral lateral epicondyle tenderness (right more than left)  NROM of hips  Bilateral IT band tenderness - mid band (Left more than right)    Z-Deformities:   no  [place]ck Deformities:  no  Boutonierre's Deformities:  no  Ulnar Deviation:   no  MCP Subluxation:  no     Joint Count 2/7/2020 12/6/2019   Patient pain (0-100) 20 -   MHAQ 0.25 0.25   Left elbow - Tender - 1   Left elbow - Swollen - 1   Left wrist- Tender - 1   Left wrist- Swollen - 0   Left 2nd PIP - Tender - 1   Left 2nd PIP - Swollen - 0   Left 4th PIP - Tender - 1   Left 4th PIP - Swollen - 0   Right elbow - Tender - 1   Right elbow - Swollen - 1   Right wrist- Tender - 1   Right wrist- Swollen - 1   Right 2nd MCP - Tender - 1   Right 2nd MCP - Swollen - 0   Right 4th MCP - Tender - 1   Right 4th MCP - Swollen - 0   Right 3rd PIP - Tender - 1   Right 3rd PIP - Swollen - 0   Tender Joint Count (Total) - 9   Swollen Joint Count (Total) - 3   Physician Assessment (0-10) - 3   Patient Assessment (0-10) 4 4.5   CDAI Total (calculated) - 19.5       DATA REVIEW    Laboratory     Recent laboratory results were reviewed, summarized, and discussed with the patient. Imaging    Musculoskeletal Ultrasound    None    Radiographs    None    CT Imaging    None    MR Imaging    MRI Brain with and without contrast 12/28/2018: There is no intracranial mass, hemorrhage or acute infarction. Optic nerves are grossly symmetric without evidence of abnormal postcontrast enhancement.  Solitary focus of increased T2 signal intensity in the left corona radiata without postcontrast enhancement. There is no abnormal parenchymal enhancement. There is no abnormal meningeal enhancement demonstrated. The brain architecture is normal. There is no evidence of midline shift or mass-effect. The ventricles are normal in size, position and configuration.  There are no extra-axial fluid collections. Major intracranial vascular flow-voids are unremarkable. The orbits are grossly symmetric. Dural venous sinuses are grossly unremarkable. There is no Chiari or syrinx. Pituitary and infundibulum grossly unremarkable. Cerebellopontine angles are unremarkable. No skull base mass is identified. Cavernous sinuses are symmetric. The mastoid air cells and are well pneumatized and clear. MRI Cervical Spine with and without contrast showed There is normal alignment of the cervical spine. Vertebral body heights are maintained. Marrow signal is normal. The craniocervical junction is intact. The course, caliber, and signal intensity of the spinal cord are normal. There is no pathologic intrathecal enhancement. The paraspinal soft tissues are within normal limits. C2-C3: No herniation or stenosis. C3-C4: No herniation or stenosis. C4-C5: No herniation or stenosis. C5-C6: No herniation or stenosis. C6-C7: No herniation or stenosis. C7-T1: No herniation or stenosis. DXA     None    ASSESSMENT AND PLAN    This is a follow-up visit for Mr. Hassan. 1) Seronegative Inflammatory Arthritis. He has a history of inflammatory joint symptoms. He did not have any relief with prednisone. I will therefore try a Medrol taper and I asked him to inform me of his response after 7 days (after 20 mg). If he has no relief, then unlikely are his symptoms inflammatory. If improvement, I will discuss DMARDs.     2) Bilateral Lateral Epicondylitis. This may be overuse mechanical or enthesitis. I recommended he wear a brace.    3) Dyspnea. This is intermittent.  I defer to his PCP. The patient voiced understanding of the aforementioned assessment and plan. Summary of plan was provided in the After Visit Summary patient instructions. TODAY'S ORDERS    Orders Placed This Encounter    methylPREDNISolone (MEDROL) 4 mg tab     No future appointments.     Heath Nieves MD, [address] [facility]    Adult Rheumatology   Rheumatology Ultrasound Certified  [address]  [address] [address]   Phone [phone]  Fax [phone]

## 2020-02-07 NOTE — LETTER
2/7/20 Patient: Mikki Gomez YOB: 1975 Date of Visit: 2/7/2020 Karina Valera NP 
[address] [address] VIA Facsimile: [phone] Dear Karina Valera NP, Thank you for referring Mr. Mikki Gomez to [address] [place] [address] for evaluation. My notes for this consultation are attached. If you have questions, please do not hesitate to call me. I look forward to following your patient along with you.  
 
 
Sincerely, 
 
Mindi Reyes MD

## 2020-02-07 NOTE — PATIENT INSTRUCTIONS
I will start you on a short course of Steroids, called a Medrol taper, with 4 mg tablets. This regimen is to be taken as follows:      - 4 tablets (16 mg), all at once, daily for 7 days   - 3 tablets (12 mg), all at once, daily for 7 days   - 2 tablets (8 mg), all at once, daily for 14 days   - 1 tablet (4 mg), daily for 14 days   - then STOP    If you feel no improvement after 7 days, stop it but inform me.     Let me now if you feel better on Medrol or no difference after you complete 4 tabs a day

## 2020-02-16 ENCOUNTER — PATIENT MESSAGE (OUTPATIENT)
Dept: RHEUMATOLOGY | Age: 45
End: 2020-02-16

## 2020-02-16 DIAGNOSIS — M25.50 POLYARTHRALGIA: Primary | ICD-10-CM

## 2020-02-24 RX ORDER — FOLIC ACID 1 MG/1
1 TABLET ORAL DAILY
Qty: 90 TAB | Refills: 0 | Status: SHIPPED | OUTPATIENT
Start: 2020-02-24

## 2020-02-24 RX ORDER — METHOTREXATE 2.5 MG/1
15 TABLET ORAL
Qty: 72 TAB | Refills: 0 | Status: SHIPPED | OUTPATIENT
Start: 2020-02-29

## 2020-02-24 NOTE — TELEPHONE ENCOUNTER
From: Robert Newton  Sent: 2/20/2020 12:56 PM EST  To: Magy Edwards MD  Subject: RE: Non-Urgent Medical Question    Yes  ----- Message -----  From: Magy Edwards MD  Sent: 2/19/2020 11:52 AM EST  To: Robert Newton  Subject: RE: Non-Urgent Medical Question  So the prednisone worked as good as Medrol?      ----- Message -----   From: Robert Newton   Sent: 2/19/2020 11:19 AM EST   To: Magy Edwards MD  Subject: RE: Non-Urgent Medical Question    Aleve or Advil don't help with the pain and soreness. The prednisone does not make the pain and soreness go away completely. ----- Message -----  From: Magy Edwards MD  Sent: 2/18/2020 3:01 PM EST  To: Robert Newton  Subject: RE: Non-Urgent Medical Question  Compared to prednisone? Compared to an Aleve or Advil?      ----- Message -----   From: Robert Newton   Sent: 2/18/2020 2:17 PM EST   To: Magy Edwards MD  Subject: RE: Non-Urgent Medical Question    It doesn't take the pain completely away but it fills it. It does give energy. After 3-5 hours I feel like I haven't take Anything.  ----- Message -----  From: Magy Edwards MD  Sent: 2/17/2020 5:40 AM EST  To: Robert Newton  Subject: RE: Non-Urgent Medical Question  How does it help?      ----- Message -----   From: Robert Newton   Sent: 2/16/2020 5:49 PM EST   To: Magy Edwards MD  Subject: RE: Non-Urgent Medical Question    You asked me to let you know how the medicine. The prednisone makes me feel better for a couple hours in the morning but then it goes back to hurting all over.    Thanks [employer]

## 2021-04-14 ENCOUNTER — TRANSCRIBE ORDER (OUTPATIENT)
Dept: SCHEDULING | Age: 46
End: 2021-04-14

## 2022-03-18 PROBLEM — E66.01 SEVERE OBESITY (HCC): Status: ACTIVE | Noted: 2018-10-03

## 2022-03-19 PROBLEM — F17.200 SMOKER: Status: ACTIVE | Noted: 2017-07-31

## 2022-03-19 PROBLEM — R00.2 PALPITATIONS: Status: ACTIVE | Noted: 2017-07-31

## 2023-04-08 LAB — HBA1C MFR BLD HPLC: 5.4 %

## 2023-05-19 RX ORDER — AMLODIPINE BESYLATE 5 MG/1
5 TABLET ORAL DAILY
COMMUNITY

## 2023-05-19 RX ORDER — GEMFIBROZIL 600 MG/1
600 TABLET, FILM COATED ORAL 2 TIMES DAILY
COMMUNITY

## 2023-05-19 RX ORDER — FOLIC ACID 1 MG/1
1 TABLET ORAL DAILY
COMMUNITY
Start: 2020-02-24

## 2023-05-19 RX ORDER — TAMSULOSIN HYDROCHLORIDE 0.4 MG/1
0.4 CAPSULE ORAL PRN
COMMUNITY

## 2023-05-19 RX ORDER — OMEPRAZOLE 20 MG/1
20 TABLET, DELAYED RELEASE ORAL 2 TIMES DAILY
COMMUNITY

## 2023-09-08 ENCOUNTER — OFFICE VISIT (OUTPATIENT)
Age: 48
End: 2023-09-08
Payer: COMMERCIAL

## 2023-09-08 VITALS
HEART RATE: 69 BPM | OXYGEN SATURATION: 97 % | WEIGHT: 271 LBS | DIASTOLIC BLOOD PRESSURE: 90 MMHG | SYSTOLIC BLOOD PRESSURE: 122 MMHG | BODY MASS INDEX: 37.8 KG/M2

## 2023-09-08 DIAGNOSIS — R41.3 MEMORY LOSS: Primary | ICD-10-CM

## 2023-09-08 DIAGNOSIS — R20.0 NUMBNESS AND TINGLING: ICD-10-CM

## 2023-09-08 DIAGNOSIS — R20.2 NUMBNESS AND TINGLING: ICD-10-CM

## 2023-09-08 DIAGNOSIS — M06.00 SERONEGATIVE RHEUMATOID ARTHRITIS (HCC): ICD-10-CM

## 2023-09-08 DIAGNOSIS — G47.33 OSA (OBSTRUCTIVE SLEEP APNEA): ICD-10-CM

## 2023-09-08 PROCEDURE — 99244 OFF/OP CNSLTJ NEW/EST MOD 40: CPT | Performed by: PSYCHIATRY & NEUROLOGY

## 2023-09-08 RX ORDER — MEDROXYPROGESTERONE ACETATE 150 MG/ML
INJECTION, SUSPENSION INTRAMUSCULAR
COMMUNITY
Start: 2023-08-17

## 2023-09-08 RX ORDER — CELECOXIB 200 MG/1
200 CAPSULE ORAL 2 TIMES DAILY
COMMUNITY
Start: 2023-07-24

## 2023-09-08 RX ORDER — ALBUTEROL SULFATE 90 UG/1
AEROSOL, METERED RESPIRATORY (INHALATION)
COMMUNITY
Start: 2023-08-04

## 2023-09-08 RX ORDER — BUDESONIDE, GLYCOPYRROLATE, AND FORMOTEROL FUMARATE 160; 9; 4.8 UG/1; UG/1; UG/1
AEROSOL, METERED RESPIRATORY (INHALATION)
COMMUNITY
Start: 2023-06-09

## 2023-09-08 RX ORDER — ROSUVASTATIN CALCIUM 10 MG/1
TABLET, COATED ORAL
COMMUNITY
Start: 2023-08-27

## 2023-09-08 NOTE — PROGRESS NOTES
Muscle weakness and pains, has been years and progressively getting worse  Said all extremities are affected  Pains are a constant ache  Cramps in calf  Burning, tingling, numbness in feet bilateral, is a prediabetic   EMG, done a [facility] of both uppes and lowers    Memory has been around for years, progressivly getting worse  Mainly with short term, repeating questions, cant remember names, trouble staying on task  Mention head injury 2 years back, fell out of tree stand
sleep apnea, routine mental and structured physical exercises, potential neuropsychological testing    This note was created using voice recognition software. Despite editing, there may be syntax errors.

## 2023-09-22 NOTE — TELEPHONE ENCOUNTER
Spoke with the patient regarding scheduling a sleep consult. He stated that he would call back.
None

## 2024-01-01 NOTE — TELEPHONE ENCOUNTER
I called and confirmed with the patient on 12/5/2019 for their next day appointment 12/6/2019 to arrive 30 minutes before their appointment to fill out office paperwork patient was also told to please brings records. SDH.
anemia, will transfuse, no active blood loss, tachy but stable  will admit to tele for investigation of anemia and establish DM regimen for sugar control

## 2024-01-19 ENCOUNTER — OFFICE VISIT (OUTPATIENT)
Age: 49
End: 2024-01-19

## 2024-01-19 VITALS
BODY MASS INDEX: 39.37 KG/M2 | DIASTOLIC BLOOD PRESSURE: 82 MMHG | OXYGEN SATURATION: 95 % | SYSTOLIC BLOOD PRESSURE: 120 MMHG | HEART RATE: 73 BPM | HEIGHT: 71 IN | WEIGHT: 281.2 LBS

## 2024-01-19 DIAGNOSIS — E66.01 SEVERE OBESITY (HCC): ICD-10-CM

## 2024-01-19 DIAGNOSIS — R00.2 PALPITATIONS: ICD-10-CM

## 2024-01-19 DIAGNOSIS — R06.00 DYSPNEA, UNSPECIFIED TYPE: ICD-10-CM

## 2024-01-19 DIAGNOSIS — R07.9 CHEST PAIN, UNSPECIFIED TYPE: Primary | ICD-10-CM

## 2024-01-19 RX ORDER — METHOTREXATE 2.5 MG/1
2.5 TABLET ORAL WEEKLY
COMMUNITY

## 2024-01-19 RX ORDER — FOLIC ACID 1 MG/1
1 TABLET ORAL DAILY
COMMUNITY

## 2024-01-19 RX ORDER — OMEPRAZOLE 20 MG/1
20 CAPSULE, DELAYED RELEASE ORAL DAILY
COMMUNITY

## 2024-01-19 NOTE — PROGRESS NOTES
Reason to see patient: Chest pain, SOB.     Referring: Jaswant Woo PA    HPI: Sumanth Diana is a 48 y.o. male With past medical history significant for hypertension, dyslipidemia, seronegative rheumatoid arthritis is here for evaluation.  He has been having symptoms of chest pain, shortness of breath from last few months.  He has had evaluation with his PCP as well as pulmonary associates.  Evaluation included an echocardiogram performed in July 2023 which demonstrated EF of 48% and visual EF of 50 to 55%.  This triggered an evaluation with cardiology office today.  His chest pain is short-lived spontaneously resolving and described as having sharp pain in the anterior chest wall sometimes radiating to the left arm.  He has been having these chest pain symptoms for years.  Shortness of breath has been more so in the last few months.  This is exertional in nature.  No associated symptoms palpitations, lightheadedness dizziness, presyncope or syncope.  Does not smoke tobacco.  Family history is not significant for any heart disease.    EKG in my office today demonstrated normal sinus rhythm with T wave inversions inferiorly, normal axis, normal intervals, normal ST segment.    Plan:    1.  Chest pain: Symptoms are atypical for angina however LVEF of 48% on echocardiogram therefore we will proceed with a stress echocardiogram to assess both CAD as well as stress response to the LV function.  Also will check CAC score.    2.  History of hypertension: Continue current medication with amlodipine and metoprolol.  Blood pressure is controlled.    3.  Dyslipidemia: Continue rosuvastatin.    4.  For seronegative rheumatoid arthritis: Continue methotrexate and Enbrel.    5. Memory loss: He is being seen by neurology.    6.  WINNIE: Unable to use CPAP.     7. Will see him again in 1 month.        ATTENTION:   This medical record was transcribed using an electronic medical records/speech recognition system.  Although proofread,

## 2024-01-19 NOTE — PROGRESS NOTES
Chief Complaint   Patient presents with    Shortness of Breath    Palpitations     Vitals:    01/19/24 0936   BP: 120/82   Site: Left Upper Arm   Position: Sitting   Cuff Size: Large Adult   Pulse: 73   SpO2: 95%   Weight: 127.6 kg (281 lb 3.2 oz)   Height: 1.803 m (5' 11\")     No active chest pain noted  No recent recent hospitilizations/urgent care visits  No refills needed

## 2024-02-09 ENCOUNTER — HOSPITAL ENCOUNTER (OUTPATIENT)
Facility: HOSPITAL | Age: 49
Discharge: HOME | End: 2024-02-09
Attending: INTERNAL MEDICINE

## 2024-02-09 DIAGNOSIS — R07.9 CHEST PAIN, UNSPECIFIED TYPE: ICD-10-CM

## 2024-02-09 DIAGNOSIS — R06.00 DYSPNEA, UNSPECIFIED TYPE: ICD-10-CM

## 2024-02-09 DIAGNOSIS — R00.2 PALPITATIONS: ICD-10-CM

## 2024-02-09 PROCEDURE — 75571 CT HRT W/O DYE W/CA TEST: CPT

## 2024-02-09 NOTE — RESULT ENCOUNTER NOTE
Pls notify>> your CAC score is mildly elevated. The CAC score is 5  This means you have mild plaque buildup in your arteries of the heart. Usually this means no significant blockage and can be managed by with lifestyle changes and medications. Eat healthy diet that includes fresh vegetables, fruits, nuts, low carb foods, free of GMO and gluten. Reduce saturated fat, sugars, soda's. Aerobic exercise daily. Maintain a normal weight, stop smoking tobacco if you are currently a smoker.   Also, Statins helps stabilize the plaque and can slow down the progression.    Recommend Crestor 10mg po  daily.

## 2024-02-12 ENCOUNTER — TELEPHONE (OUTPATIENT)
Age: 49
End: 2024-02-12

## 2024-02-12 NOTE — TELEPHONE ENCOUNTER
Received VO from Dr. Leonardo Johnson:    Recommend Crestor 10mg po  daily.       Spoke with the pt,, identified the pt with name and .  Pt is already taking Crestor 10 mg

## 2024-02-16 ENCOUNTER — OFFICE VISIT (OUTPATIENT)
Age: 49
End: 2024-02-16
Payer: COMMERCIAL

## 2024-02-16 VITALS
HEART RATE: 84 BPM | DIASTOLIC BLOOD PRESSURE: 80 MMHG | OXYGEN SATURATION: 96 % | HEIGHT: 71 IN | WEIGHT: 277 LBS | SYSTOLIC BLOOD PRESSURE: 138 MMHG | BODY MASS INDEX: 38.78 KG/M2

## 2024-02-16 DIAGNOSIS — I25.83 CORONARY ARTERY DISEASE DUE TO LIPID RICH PLAQUE: ICD-10-CM

## 2024-02-16 DIAGNOSIS — R07.9 CHEST PAIN, UNSPECIFIED TYPE: ICD-10-CM

## 2024-02-16 DIAGNOSIS — R00.2 PALPITATIONS: Primary | ICD-10-CM

## 2024-02-16 DIAGNOSIS — I25.10 CORONARY ARTERY DISEASE DUE TO LIPID RICH PLAQUE: ICD-10-CM

## 2024-02-16 PROCEDURE — 99213 OFFICE O/P EST LOW 20 MIN: CPT | Performed by: INTERNAL MEDICINE

## 2024-02-16 NOTE — PROGRESS NOTES
Chief Complaint   Patient presents with    Chest Pain    Palpitations    Other     DYSPNEA     Vitals:    02/16/24 1446   BP: 138/80   Site: Left Upper Arm   Position: Sitting   Cuff Size: Medium Adult   Pulse: 84   SpO2: 96%   Weight: 125.6 kg (277 lb)   Height: 1.803 m (5' 11\")      /80 (Site: Left Upper Arm, Position: Sitting, Cuff Size: Medium Adult)   Pulse 84   Ht 1.803 m (5' 11\")   Wt 125.6 kg (277 lb)   SpO2 96%   BMI 38.63 kg/m²

## 2024-02-16 NOTE — PROGRESS NOTES
Office Follow-up    NAME: Sumanth Diana   :  1975  MRM:  [id number]    Date:  2024         Assessment and Plan:     1.  Chest pain/ CAD with CAC score of 5 24: Stress Echo 24 was normal with appropriate augmentation of wall motion and normal functional capacity. CAC show 5 in LAD. Currently FLP is from 2023 from PCP which show LDL 75. Will increase Crestor to 20mg po daily. Repeat FLP in 2 months for goal LDL of 55 or lower.      2.  History of hypertension: Continue current medication with amlodipine.  Blood pressure is controlled.     3.  Dyslipidemia: Continue rosuvastatin. As above.      4.  For seronegative rheumatoid arthritis: Continue methotrexate and Enbrel.     5. Memory loss: He is being seen by neurology.     6.  WINNIE: Unable to use CPAP.      7. See Christiana Zuniga NP in 1 yr.                    ATTENTION:   This medical record was transcribed using an electronic medical records/speech recognition system.  Although proofread, it may and can contain electronic, spelling and other errors.  Corrections may be executed at a later time.  Please feel free to contact us for any clarifications as needed.      Subjective:     Sumanth Diana, a 48 y.o. year-old who presents for followup.    ---------    HPI: Sumanth Diana is a 48 y.o. male With past medical history significant for hypertension, dyslipidemia, seronegative rheumatoid arthritis is here for evaluation.  He has been having symptoms of chest pain, shortness of breath from last few months.  He has had evaluation with his PCP as well as pulmonary associates.  Evaluation included an echocardiogram performed in 2023 which demonstrated EF of 48% and visual EF of 50 to 55%.  This triggered an evaluation with cardiology office today.  His chest pain is short-lived spontaneously resolving and described as having sharp pain in the anterior chest wall sometimes radiating to the left arm.  He has been having these chest pain symptoms for

## 2024-02-27 ENCOUNTER — CLINICAL DOCUMENTATION (OUTPATIENT)
Age: 49
End: 2024-02-27

## 2024-02-27 NOTE — PROGRESS NOTES
Request for sleep study received from Maria Dolores at [facility] - Dr. Peter Ricks Office. Sleep study results faxed to [phone] for continuation of care.

## 2025-02-21 ENCOUNTER — CLINICAL DOCUMENTATION (OUTPATIENT)
Age: 50
End: 2025-02-21